# Patient Record
Sex: FEMALE | Race: WHITE | NOT HISPANIC OR LATINO | URBAN - METROPOLITAN AREA
[De-identification: names, ages, dates, MRNs, and addresses within clinical notes are randomized per-mention and may not be internally consistent; named-entity substitution may affect disease eponyms.]

---

## 2019-01-04 ENCOUNTER — OUTPATIENT (OUTPATIENT)
Dept: OUTPATIENT SERVICES | Facility: HOSPITAL | Age: 44
LOS: 1 days | Discharge: HOME | End: 2019-01-04

## 2019-01-04 VITALS
WEIGHT: 165.79 LBS | TEMPERATURE: 98 F | DIASTOLIC BLOOD PRESSURE: 61 MMHG | OXYGEN SATURATION: 100 % | HEIGHT: 65 IN | RESPIRATION RATE: 16 BRPM | HEART RATE: 79 BPM | SYSTOLIC BLOOD PRESSURE: 102 MMHG

## 2019-01-04 DIAGNOSIS — Z01.818 ENCOUNTER FOR OTHER PREPROCEDURAL EXAMINATION: ICD-10-CM

## 2019-01-04 DIAGNOSIS — M20.12 HALLUX VALGUS (ACQUIRED), LEFT FOOT: ICD-10-CM

## 2019-01-04 NOTE — H&P PST ADULT - PMH
Backache symptom  CAUSING ARM/LEG TINGLING  Bruise  as per patient-  bruising after banging.  Hypercholesteremia    Neckache

## 2019-01-04 NOTE — H&P PST ADULT - REASON FOR ADMISSION
PT PRESENTS TO PAST WITH NO SOB, CP, PALPITATIONS, DYSURIA, UTI OR URI AT PRESENT.   PT ABLE TO WALK UP 2-3 FLIGHTS OF STEPS WITH NO SOB.  AS PER THE PT, THIS IS HIS/HER COMPLETE MEDICAL AND SURGICAL HX, INCLUDING MEDICATIONS PRESCRIBED AND OVER THE COUNTER.   PT REPORTS- 2 WKS AGO SHE HAD A SINUS INFECTION.   PT WAS TREATED WITH AN ANTIBIOTIC.   PT STATES-- ALL S/S HAVE SUBSIDED.    PT PRESENTS TO PAST WITH H/O-- LEFT BUNION.

## 2019-01-04 NOTE — H&P PST ADULT - RS GEN PE MLT RESP DETAILS PC
respirations non-labored/no chest wall tenderness/airway patent/clear to auscultation bilaterally/no intercostal retractions/normal/good air movement/breath sounds equal

## 2019-01-25 ENCOUNTER — RESULT REVIEW (OUTPATIENT)
Age: 44
End: 2019-01-25

## 2019-01-25 ENCOUNTER — OUTPATIENT (OUTPATIENT)
Dept: OUTPATIENT SERVICES | Facility: HOSPITAL | Age: 44
LOS: 1 days | Discharge: HOME | End: 2019-01-25

## 2019-01-25 VITALS
TEMPERATURE: 97 F | HEART RATE: 68 BPM | OXYGEN SATURATION: 98 % | RESPIRATION RATE: 17 BRPM | DIASTOLIC BLOOD PRESSURE: 78 MMHG | WEIGHT: 166.01 LBS | SYSTOLIC BLOOD PRESSURE: 123 MMHG | HEIGHT: 65 IN

## 2019-01-25 VITALS — SYSTOLIC BLOOD PRESSURE: 109 MMHG | RESPIRATION RATE: 18 BRPM | HEART RATE: 55 BPM | DIASTOLIC BLOOD PRESSURE: 79 MMHG

## 2019-01-25 RX ORDER — ONDANSETRON 8 MG/1
4 TABLET, FILM COATED ORAL ONCE
Qty: 0 | Refills: 0 | Status: DISCONTINUED | OUTPATIENT
Start: 2019-01-25 | End: 2019-02-09

## 2019-01-25 RX ORDER — HYDROMORPHONE HYDROCHLORIDE 2 MG/ML
0.5 INJECTION INTRAMUSCULAR; INTRAVENOUS; SUBCUTANEOUS
Qty: 0 | Refills: 0 | Status: DISCONTINUED | OUTPATIENT
Start: 2019-01-25 | End: 2019-01-25

## 2019-01-25 RX ORDER — OXYCODONE AND ACETAMINOPHEN 5; 325 MG/1; MG/1
1 TABLET ORAL ONCE
Qty: 0 | Refills: 0 | Status: DISCONTINUED | OUTPATIENT
Start: 2019-01-25 | End: 2019-01-25

## 2019-01-25 RX ORDER — ACETAMINOPHEN 500 MG
650 TABLET ORAL ONCE
Qty: 0 | Refills: 0 | Status: DISCONTINUED | OUTPATIENT
Start: 2019-01-25 | End: 2019-02-09

## 2019-01-25 RX ORDER — SODIUM CHLORIDE 9 MG/ML
1000 INJECTION, SOLUTION INTRAVENOUS
Qty: 0 | Refills: 0 | Status: DISCONTINUED | OUTPATIENT
Start: 2019-01-25 | End: 2019-02-09

## 2019-01-25 NOTE — BRIEF OPERATIVE NOTE - PROCEDURE
<<-----Click on this checkbox to enter Procedure Eliezer bunionectomy  01/25/2019  Eliezer aguilera left foot  Active  [id number]

## 2019-01-25 NOTE — ASU DISCHARGE PLAN (ADULT/PEDIATRIC). - NOTIFY
Swelling that continues/Fever greater than 101/Numbness, tingling/Pain not relieved by Medications/Inability to Tolerate Liquids or Foods/Excessive Diarrhea/Numbness, color, or temperature change to extremity/Bleeding that does not stop/Persistent Nausea and Vomiting

## 2019-01-29 LAB — SURGICAL PATHOLOGY STUDY: SIGNIFICANT CHANGE UP

## 2019-01-31 DIAGNOSIS — M54.2 CERVICALGIA: ICD-10-CM

## 2019-01-31 DIAGNOSIS — Z87.891 PERSONAL HISTORY OF NICOTINE DEPENDENCE: ICD-10-CM

## 2019-01-31 DIAGNOSIS — M54.9 DORSALGIA, UNSPECIFIED: ICD-10-CM

## 2019-01-31 DIAGNOSIS — E78.00 PURE HYPERCHOLESTEROLEMIA, UNSPECIFIED: ICD-10-CM

## 2019-01-31 DIAGNOSIS — M20.12 HALLUX VALGUS (ACQUIRED), LEFT FOOT: ICD-10-CM

## 2019-03-28 ENCOUNTER — OUTPATIENT (OUTPATIENT)
Dept: OUTPATIENT SERVICES | Facility: HOSPITAL | Age: 44
LOS: 1 days | Discharge: HOME | End: 2019-03-28

## 2019-03-28 DIAGNOSIS — Z12.31 ENCOUNTER FOR SCREENING MAMMOGRAM FOR MALIGNANT NEOPLASM OF BREAST: ICD-10-CM

## 2019-03-28 PROBLEM — M54.9 DORSALGIA, UNSPECIFIED: Chronic | Status: ACTIVE | Noted: 2019-01-04

## 2019-03-28 PROBLEM — T14.8XXA OTHER INJURY OF UNSPECIFIED BODY REGION, INITIAL ENCOUNTER: Chronic | Status: ACTIVE | Noted: 2019-01-04

## 2019-03-28 PROBLEM — E78.00 PURE HYPERCHOLESTEROLEMIA, UNSPECIFIED: Chronic | Status: ACTIVE | Noted: 2019-01-04

## 2019-03-28 PROBLEM — M54.2 CERVICALGIA: Chronic | Status: ACTIVE | Noted: 2019-01-04

## 2020-07-20 ENCOUNTER — OUTPATIENT (OUTPATIENT)
Dept: OUTPATIENT SERVICES | Facility: HOSPITAL | Age: 45
LOS: 1 days | Discharge: HOME | End: 2020-07-20
Payer: COMMERCIAL

## 2020-07-20 VITALS
OXYGEN SATURATION: 99 % | TEMPERATURE: 98 F | RESPIRATION RATE: 16 BRPM | DIASTOLIC BLOOD PRESSURE: 78 MMHG | HEIGHT: 65 IN | SYSTOLIC BLOOD PRESSURE: 119 MMHG | WEIGHT: 182.1 LBS | HEART RATE: 60 BPM

## 2020-07-20 DIAGNOSIS — T85.79XD INFECTION AND INFLAMMATORY REACTION DUE TO OTHER INTERNAL PROSTHETIC DEVICES, IMPLANTS AND GRAFTS, SUBSEQUENT ENCOUNTER: ICD-10-CM

## 2020-07-20 DIAGNOSIS — Z01.818 ENCOUNTER FOR OTHER PREPROCEDURAL EXAMINATION: ICD-10-CM

## 2020-07-20 DIAGNOSIS — T85.79XA INFECTION AND INFLAMMATORY REACTION DUE TO OTHER INTERNAL PROSTHETIC DEVICES, IMPLANTS AND GRAFTS, INITIAL ENCOUNTER: ICD-10-CM

## 2020-07-20 DIAGNOSIS — Z98.890 OTHER SPECIFIED POSTPROCEDURAL STATES: Chronic | ICD-10-CM

## 2020-07-20 LAB
ALBUMIN SERPL ELPH-MCNC: 4.3 G/DL — SIGNIFICANT CHANGE UP (ref 3.5–5.2)
ALP SERPL-CCNC: 86 U/L — SIGNIFICANT CHANGE UP (ref 30–115)
ALT FLD-CCNC: 26 U/L — SIGNIFICANT CHANGE UP (ref 0–41)
ANION GAP SERPL CALC-SCNC: 13 MMOL/L — SIGNIFICANT CHANGE UP (ref 7–14)
AST SERPL-CCNC: 30 U/L — SIGNIFICANT CHANGE UP (ref 0–41)
BASOPHILS # BLD AUTO: 0.02 K/UL — SIGNIFICANT CHANGE UP (ref 0–0.2)
BASOPHILS NFR BLD AUTO: 0.4 % — SIGNIFICANT CHANGE UP (ref 0–1)
BILIRUB SERPL-MCNC: 0.3 MG/DL — SIGNIFICANT CHANGE UP (ref 0.2–1.2)
BUN SERPL-MCNC: 15 MG/DL — SIGNIFICANT CHANGE UP (ref 10–20)
CALCIUM SERPL-MCNC: 9.7 MG/DL — SIGNIFICANT CHANGE UP (ref 8.5–10.1)
CHLORIDE SERPL-SCNC: 104 MMOL/L — SIGNIFICANT CHANGE UP (ref 98–110)
CO2 SERPL-SCNC: 26 MMOL/L — SIGNIFICANT CHANGE UP (ref 17–32)
CREAT SERPL-MCNC: 0.8 MG/DL — SIGNIFICANT CHANGE UP (ref 0.7–1.5)
EOSINOPHIL # BLD AUTO: 0.14 K/UL — SIGNIFICANT CHANGE UP (ref 0–0.7)
EOSINOPHIL NFR BLD AUTO: 2.7 % — SIGNIFICANT CHANGE UP (ref 0–8)
GLUCOSE SERPL-MCNC: 89 MG/DL — SIGNIFICANT CHANGE UP (ref 70–99)
HCT VFR BLD CALC: 42.8 % — SIGNIFICANT CHANGE UP (ref 37–47)
HGB BLD-MCNC: 14.1 G/DL — SIGNIFICANT CHANGE UP (ref 12–16)
IMM GRANULOCYTES NFR BLD AUTO: 0.4 % — HIGH (ref 0.1–0.3)
LYMPHOCYTES # BLD AUTO: 1.85 K/UL — SIGNIFICANT CHANGE UP (ref 1.2–3.4)
LYMPHOCYTES # BLD AUTO: 35.9 % — SIGNIFICANT CHANGE UP (ref 20.5–51.1)
MCHC RBC-ENTMCNC: 31.8 PG — HIGH (ref 27–31)
MCHC RBC-ENTMCNC: 32.9 G/DL — SIGNIFICANT CHANGE UP (ref 32–37)
MCV RBC AUTO: 96.6 FL — SIGNIFICANT CHANGE UP (ref 81–99)
MONOCYTES # BLD AUTO: 0.54 K/UL — SIGNIFICANT CHANGE UP (ref 0.1–0.6)
MONOCYTES NFR BLD AUTO: 10.5 % — HIGH (ref 1.7–9.3)
NEUTROPHILS # BLD AUTO: 2.58 K/UL — SIGNIFICANT CHANGE UP (ref 1.4–6.5)
NEUTROPHILS NFR BLD AUTO: 50.1 % — SIGNIFICANT CHANGE UP (ref 42.2–75.2)
NRBC # BLD: 0 /100 WBCS — SIGNIFICANT CHANGE UP (ref 0–0)
PLATELET # BLD AUTO: 243 K/UL — SIGNIFICANT CHANGE UP (ref 130–400)
POTASSIUM SERPL-MCNC: 4.5 MMOL/L — SIGNIFICANT CHANGE UP (ref 3.5–5)
POTASSIUM SERPL-SCNC: 4.5 MMOL/L — SIGNIFICANT CHANGE UP (ref 3.5–5)
PROT SERPL-MCNC: 7.1 G/DL — SIGNIFICANT CHANGE UP (ref 6–8)
RBC # BLD: 4.43 M/UL — SIGNIFICANT CHANGE UP (ref 4.2–5.4)
RBC # FLD: 13 % — SIGNIFICANT CHANGE UP (ref 11.5–14.5)
SODIUM SERPL-SCNC: 143 MMOL/L — SIGNIFICANT CHANGE UP (ref 135–146)
WBC # BLD: 5.15 K/UL — SIGNIFICANT CHANGE UP (ref 4.8–10.8)
WBC # FLD AUTO: 5.15 K/UL — SIGNIFICANT CHANGE UP (ref 4.8–10.8)

## 2020-07-20 PROCEDURE — 93010 ELECTROCARDIOGRAM REPORT: CPT

## 2020-07-20 RX ORDER — DICLOFENAC SODIUM 75 MG/1
1 TABLET, DELAYED RELEASE ORAL
Qty: 0 | Refills: 0 | DISCHARGE

## 2020-07-20 RX ORDER — IBUPROFEN 200 MG
1 TABLET ORAL
Qty: 0 | Refills: 0 | DISCHARGE

## 2020-07-20 RX ORDER — GABAPENTIN 400 MG/1
0 CAPSULE ORAL
Qty: 0 | Refills: 0 | DISCHARGE

## 2020-07-20 NOTE — H&P PST ADULT - HISTORY OF PRESENT ILLNESS
46 yo female presents s/p left bunionectomy 1/2019, pt has been c/o "pin is sticking out" pt is scheduled for k wire removal;  denies chest pain, palpitations, shortness of breath, dyspnea, or dysuria. exercise tolerance: walks 6 miles of stairs w/o sob   pt denies any known exposure to COVID-19, denies any S&S  pt instructed re: self quarantine guidelines

## 2020-07-20 NOTE — H&P PST ADULT - NSICDXPASTMEDICALHX_GEN_ALL_CORE_FT
PAST MEDICAL HISTORY:  Backache symptom CAUSING ARM/LEG TINGLING    Bruise as per patient-  bruising after banging.    Chronic GERD     Hypercholesteremia     Lumbar disc herniation     Neckache

## 2020-07-21 ENCOUNTER — OUTPATIENT (OUTPATIENT)
Dept: OUTPATIENT SERVICES | Facility: HOSPITAL | Age: 45
LOS: 1 days | Discharge: HOME | End: 2020-07-21

## 2020-07-21 DIAGNOSIS — Z98.890 OTHER SPECIFIED POSTPROCEDURAL STATES: Chronic | ICD-10-CM

## 2020-07-21 DIAGNOSIS — Z11.59 ENCOUNTER FOR SCREENING FOR OTHER VIRAL DISEASES: ICD-10-CM

## 2020-07-21 PROBLEM — M51.26 OTHER INTERVERTEBRAL DISC DISPLACEMENT, LUMBAR REGION: Chronic | Status: ACTIVE | Noted: 2020-07-20

## 2020-07-21 PROBLEM — K21.9 GASTRO-ESOPHAGEAL REFLUX DISEASE WITHOUT ESOPHAGITIS: Chronic | Status: ACTIVE | Noted: 2020-07-20

## 2020-07-23 NOTE — ASU PATIENT PROFILE, ADULT - PMH
Backache symptom  CAUSING ARM/LEG TINGLING  Bruise  as per patient-  bruising after banging.  Chronic GERD    Hypercholesteremia    Lumbar disc herniation    Neckache Backache symptom  CAUSING ARM/LEG TINGLING  Bruise  as per patient-  bruising after banging.  Chronic GERD    Herniation of intervertebral disc of cervical spine due to degeneration    Hypercholesteremia    Lumbar disc herniation    Neckache

## 2020-07-24 ENCOUNTER — RESULT REVIEW (OUTPATIENT)
Age: 45
End: 2020-07-24

## 2020-07-24 ENCOUNTER — OUTPATIENT (OUTPATIENT)
Dept: OUTPATIENT SERVICES | Facility: HOSPITAL | Age: 45
LOS: 1 days | Discharge: HOME | End: 2020-07-24
Payer: COMMERCIAL

## 2020-07-24 VITALS
TEMPERATURE: 98 F | DIASTOLIC BLOOD PRESSURE: 77 MMHG | WEIGHT: 177.91 LBS | HEART RATE: 55 BPM | RESPIRATION RATE: 19 BRPM | SYSTOLIC BLOOD PRESSURE: 117 MMHG | HEIGHT: 65 IN | OXYGEN SATURATION: 98 %

## 2020-07-24 VITALS — RESPIRATION RATE: 18 BRPM | HEART RATE: 61 BPM | SYSTOLIC BLOOD PRESSURE: 116 MMHG | DIASTOLIC BLOOD PRESSURE: 65 MMHG

## 2020-07-24 DIAGNOSIS — Z98.890 OTHER SPECIFIED POSTPROCEDURAL STATES: Chronic | ICD-10-CM

## 2020-07-24 PROCEDURE — 88300 SURGICAL PATH GROSS: CPT | Mod: 26

## 2020-07-24 RX ORDER — SODIUM CHLORIDE 9 MG/ML
1000 INJECTION, SOLUTION INTRAVENOUS
Refills: 0 | Status: DISCONTINUED | OUTPATIENT
Start: 2020-07-24 | End: 2020-08-08

## 2020-07-24 RX ORDER — OXYCODONE AND ACETAMINOPHEN 5; 325 MG/1; MG/1
1 TABLET ORAL EVERY 4 HOURS
Refills: 0 | Status: DISCONTINUED | OUTPATIENT
Start: 2020-07-24 | End: 2020-07-24

## 2020-07-24 RX ORDER — HYDROMORPHONE HYDROCHLORIDE 2 MG/ML
0.5 INJECTION INTRAMUSCULAR; INTRAVENOUS; SUBCUTANEOUS
Refills: 0 | Status: DISCONTINUED | OUTPATIENT
Start: 2020-07-24 | End: 2020-07-24

## 2020-07-24 RX ADMIN — SODIUM CHLORIDE 100 MILLILITER(S): 9 INJECTION, SOLUTION INTRAVENOUS at 16:05

## 2020-07-24 NOTE — CHART NOTE - NSCHARTNOTEFT_GEN_A_CORE
PACU ANESTHESIA ADMISSION NOTE      Procedure: Removal of hardware from metatarsal bone of left foot    Post op diagnosis:  Fixation hardware in foot      ____  Intubated  TV:______       Rate: ______      FiO2: ______    __x__  Patent Airway    __x__  Full return of protective reflexes    __x__  Full recovery from anesthesia / back to baseline status    Vitals:  HR: 68  BP: 96/53  RR:14  O2 Sat: 97  Temp: 97.8    Mental Status:  __x__ Awake   _x___ Alert   _____ Drowsy   _____ Sedated    Nausea/Vomiting:  _x__ NO  ______Yes,   See Post - Op Orders          Pain Scale (0-10):  ___0__    Treatment: ____ None    ____ See Post - Op/PCA Orders    Post - Operative Fluids:   ____ Oral   ____ See Post - Op Orders    Plan: Discharge:   _x___Home       _____Floor     _____Critical Care   Other:_________________    Comments: Patient had smooth intraoperative event, no anesthesia complication.

## 2020-07-24 NOTE — ASU DISCHARGE PLAN (ADULT/PEDIATRIC) - CARE PROVIDER_API CALL
Cleveland Guevara  Podiatric Medicine and Surgery  [address] [place] [id number]  Phone: [phone]  Fax: [phone]  Follow Up Time:

## 2020-07-24 NOTE — ASU DISCHARGE PLAN (ADULT/PEDIATRIC) - CALL YOUR DOCTOR IF YOU HAVE ANY OF THE FOLLOWING:
Bleeding that does not stop/Numbness, tingling, color or temperature change to extremity/Increased irritability or sluggishness/Swelling that gets worse/Nausea and vomiting that does not stop/Wound/Surgical Site with redness, or foul smelling discharge or pus/Unable to urinate/Excessive diarrhea/Fever greater than (need to indicate Fahrenheit or Celsius)/Inability to tolerate liquids or foods/Pain not relieved by Medications

## 2020-07-24 NOTE — BRIEF OPERATIVE NOTE - NSICDXBRIEFPROCEDURE_GEN_ALL_CORE_FT
PROCEDURES:  Removal of hardware from metatarsal bone of left foot 24-Jul-2020 15:27:21  Rajesh Thomson

## 2020-07-24 NOTE — ASU DISCHARGE PLAN (ADULT/PEDIATRIC) - ASU DC SPECIAL INSTRUCTIONSFT
Keep dressing dry, clean, and intact  WBAT L foot in a surgical shoe  Follow up with Dr Guevara in his office on Monday

## 2020-07-27 LAB — SURGICAL PATHOLOGY STUDY: SIGNIFICANT CHANGE UP

## 2020-07-29 DIAGNOSIS — M50.20 OTHER CERVICAL DISC DISPLACEMENT, UNSPECIFIED CERVICAL REGION: ICD-10-CM

## 2020-07-29 DIAGNOSIS — T85.848A PAIN DUE TO OTHER INTERNAL PROSTHETIC DEVICES, IMPLANTS AND GRAFTS, INITIAL ENCOUNTER: ICD-10-CM

## 2020-07-29 DIAGNOSIS — Z87.891 PERSONAL HISTORY OF NICOTINE DEPENDENCE: ICD-10-CM

## 2020-07-29 DIAGNOSIS — Z47.2 ENCOUNTER FOR REMOVAL OF INTERNAL FIXATION DEVICE: ICD-10-CM

## 2020-07-29 DIAGNOSIS — E78.00 PURE HYPERCHOLESTEROLEMIA, UNSPECIFIED: ICD-10-CM
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
## 2020-11-02 NOTE — ASU PATIENT PROFILE, ADULT - NSSUBSTANCEUSE_GEN_ALL_CORE_SD
Nausea and vomiting that does not stop/Increased irritability or sluggishness/Unable to urinate/Inability to tolerate liquids or foods
never used

## 2021-02-05 ENCOUNTER — INPATIENT (INPATIENT)
Facility: HOSPITAL | Age: 46
LOS: 2 days | Discharge: HOME | End: 2021-02-08
Attending: HOSPITALIST | Admitting: FAMILY MEDICINE
Payer: COMMERCIAL

## 2021-02-05 VITALS
DIASTOLIC BLOOD PRESSURE: 84 MMHG | OXYGEN SATURATION: 98 % | HEART RATE: 70 BPM | SYSTOLIC BLOOD PRESSURE: 122 MMHG | RESPIRATION RATE: 20 BRPM

## 2021-02-05 DIAGNOSIS — I26.99 OTHER PULMONARY EMBOLISM WITHOUT ACUTE COR PULMONALE: ICD-10-CM

## 2021-02-05 DIAGNOSIS — R09.89 OTHER SPECIFIED SYMPTOMS AND SIGNS INVOLVING THE CIRCULATORY AND RESPIRATORY SYSTEMS: ICD-10-CM

## 2021-02-05 DIAGNOSIS — Z98.890 OTHER SPECIFIED POSTPROCEDURAL STATES: Chronic | ICD-10-CM

## 2021-02-05 PROBLEM — M50.20 OTHER CERVICAL DISC DISPLACEMENT, UNSPECIFIED CERVICAL REGION: Chronic | Status: ACTIVE | Noted: 2020-07-24

## 2021-02-05 LAB
ALBUMIN SERPL ELPH-MCNC: 4 G/DL — SIGNIFICANT CHANGE UP (ref 3.5–5.2)
ALP SERPL-CCNC: 108 U/L — SIGNIFICANT CHANGE UP (ref 30–115)
ALT FLD-CCNC: 34 U/L — SIGNIFICANT CHANGE UP (ref 0–41)
ANION GAP SERPL CALC-SCNC: 12 MMOL/L — SIGNIFICANT CHANGE UP (ref 7–14)
AST SERPL-CCNC: 25 U/L — SIGNIFICANT CHANGE UP (ref 0–41)
BASOPHILS # BLD AUTO: 0.03 K/UL — SIGNIFICANT CHANGE UP (ref 0–0.2)
BASOPHILS NFR BLD AUTO: 0.5 % — SIGNIFICANT CHANGE UP (ref 0–1)
BILIRUB SERPL-MCNC: 0.4 MG/DL — SIGNIFICANT CHANGE UP (ref 0.2–1.2)
BUN SERPL-MCNC: 17 MG/DL — SIGNIFICANT CHANGE UP (ref 10–20)
CALCIUM SERPL-MCNC: 9.5 MG/DL — SIGNIFICANT CHANGE UP (ref 8.5–10.1)
CHLORIDE SERPL-SCNC: 106 MMOL/L — SIGNIFICANT CHANGE UP (ref 98–110)
CO2 SERPL-SCNC: 22 MMOL/L — SIGNIFICANT CHANGE UP (ref 17–32)
CREAT SERPL-MCNC: 0.8 MG/DL — SIGNIFICANT CHANGE UP (ref 0.7–1.5)
EOSINOPHIL # BLD AUTO: 0.1 K/UL — SIGNIFICANT CHANGE UP (ref 0–0.7)
EOSINOPHIL NFR BLD AUTO: 1.7 % — SIGNIFICANT CHANGE UP (ref 0–8)
GLUCOSE SERPL-MCNC: 102 MG/DL — HIGH (ref 70–99)
HCG SERPL QL: NEGATIVE — SIGNIFICANT CHANGE UP
HCT VFR BLD CALC: 37.7 % — SIGNIFICANT CHANGE UP (ref 37–47)
HGB BLD-MCNC: 12.7 G/DL — SIGNIFICANT CHANGE UP (ref 12–16)
IMM GRANULOCYTES NFR BLD AUTO: 0.5 % — HIGH (ref 0.1–0.3)
LACTATE SERPL-SCNC: 1.7 MMOL/L — SIGNIFICANT CHANGE UP (ref 0.7–2)
LYMPHOCYTES # BLD AUTO: 2.08 K/UL — SIGNIFICANT CHANGE UP (ref 1.2–3.4)
LYMPHOCYTES # BLD AUTO: 36 % — SIGNIFICANT CHANGE UP (ref 20.5–51.1)
MCHC RBC-ENTMCNC: 30 PG — SIGNIFICANT CHANGE UP (ref 27–31)
MCHC RBC-ENTMCNC: 33.7 G/DL — SIGNIFICANT CHANGE UP (ref 32–37)
MCV RBC AUTO: 88.9 FL — SIGNIFICANT CHANGE UP (ref 81–99)
MONOCYTES # BLD AUTO: 0.71 K/UL — HIGH (ref 0.1–0.6)
MONOCYTES NFR BLD AUTO: 12.3 % — HIGH (ref 1.7–9.3)
NEUTROPHILS # BLD AUTO: 2.83 K/UL — SIGNIFICANT CHANGE UP (ref 1.4–6.5)
NEUTROPHILS NFR BLD AUTO: 49 % — SIGNIFICANT CHANGE UP (ref 42.2–75.2)
NRBC # BLD: 0 /100 WBCS — SIGNIFICANT CHANGE UP (ref 0–0)
NT-PROBNP SERPL-SCNC: 84 PG/ML — SIGNIFICANT CHANGE UP (ref 0–300)
PLATELET # BLD AUTO: 270 K/UL — SIGNIFICANT CHANGE UP (ref 130–400)
POTASSIUM SERPL-MCNC: 4 MMOL/L — SIGNIFICANT CHANGE UP (ref 3.5–5)
POTASSIUM SERPL-SCNC: 4 MMOL/L — SIGNIFICANT CHANGE UP (ref 3.5–5)
PROT SERPL-MCNC: 6.6 G/DL — SIGNIFICANT CHANGE UP (ref 6–8)
RAPID RVP RESULT: SIGNIFICANT CHANGE UP
RBC # BLD: 4.24 M/UL — SIGNIFICANT CHANGE UP (ref 4.2–5.4)
RBC # FLD: 12.3 % — SIGNIFICANT CHANGE UP (ref 11.5–14.5)
SARS-COV-2 RNA SPEC QL NAA+PROBE: SIGNIFICANT CHANGE UP
SODIUM SERPL-SCNC: 140 MMOL/L — SIGNIFICANT CHANGE UP (ref 135–146)
TROPONIN T SERPL-MCNC: <0.01 NG/ML — SIGNIFICANT CHANGE UP
WBC # BLD: 5.78 K/UL — SIGNIFICANT CHANGE UP (ref 4.8–10.8)
WBC # FLD AUTO: 5.78 K/UL — SIGNIFICANT CHANGE UP (ref 4.8–10.8)

## 2021-02-05 PROCEDURE — 71045 X-RAY EXAM CHEST 1 VIEW: CPT | Mod: 26

## 2021-02-05 PROCEDURE — 99285 EMERGENCY DEPT VISIT HI MDM: CPT

## 2021-02-05 PROCEDURE — 93970 EXTREMITY STUDY: CPT | Mod: 26

## 2021-02-05 PROCEDURE — 99222 1ST HOSP IP/OBS MODERATE 55: CPT

## 2021-02-05 PROCEDURE — 71275 CT ANGIOGRAPHY CHEST: CPT | Mod: 26

## 2021-02-05 RX ORDER — CHLORHEXIDINE GLUCONATE 213 G/1000ML
1 SOLUTION TOPICAL
Refills: 0 | Status: DISCONTINUED | OUTPATIENT
Start: 2021-02-05 | End: 2021-02-08

## 2021-02-05 RX ORDER — ENOXAPARIN SODIUM 100 MG/ML
80 INJECTION SUBCUTANEOUS EVERY 12 HOURS
Refills: 0 | Status: DISCONTINUED | OUTPATIENT
Start: 2021-02-05 | End: 2021-02-07

## 2021-02-05 RX ORDER — GABAPENTIN 400 MG/1
0 CAPSULE ORAL
Qty: 0 | Refills: 0 | DISCHARGE

## 2021-02-05 RX ORDER — GABAPENTIN 400 MG/1
300 CAPSULE ORAL THREE TIMES A DAY
Refills: 0 | Status: DISCONTINUED | OUTPATIENT
Start: 2021-02-05 | End: 2021-02-08

## 2021-02-05 RX ORDER — ENOXAPARIN SODIUM 100 MG/ML
80 INJECTION SUBCUTANEOUS ONCE
Refills: 0 | Status: COMPLETED | OUTPATIENT
Start: 2021-02-05 | End: 2021-02-05

## 2021-02-05 RX ORDER — CETIRIZINE HYDROCHLORIDE 10 MG/1
0 TABLET ORAL
Qty: 0 | Refills: 0 | DISCHARGE

## 2021-02-05 RX ORDER — KETOROLAC TROMETHAMINE 30 MG/ML
30 SYRINGE (ML) INJECTION EVERY 8 HOURS
Refills: 0 | Status: DISCONTINUED | OUTPATIENT
Start: 2021-02-05 | End: 2021-02-08

## 2021-02-05 RX ORDER — LORATADINE 10 MG/1
10 TABLET ORAL DAILY
Refills: 0 | Status: DISCONTINUED | OUTPATIENT
Start: 2021-02-05 | End: 2021-02-06

## 2021-02-05 RX ADMIN — Medication 30 MILLIGRAM(S): at 21:28

## 2021-02-05 RX ADMIN — ENOXAPARIN SODIUM 80 MILLIGRAM(S): 100 INJECTION SUBCUTANEOUS at 17:27

## 2021-02-05 RX ADMIN — GABAPENTIN 300 MILLIGRAM(S): 400 CAPSULE ORAL at 21:41

## 2021-02-05 NOTE — H&P ADULT - ASSESSMENT
Patient is a 45 year old Female with a past medical history of HLD not on statin, anxiety, herniated discs, recent covid 25 days ago and ended quarantine on 1/24th presented to the ER with a chief complaint of worsening SOB x 3 days. CTA Chest revealing "Small subsegmental pulmonary emboli in bilateral upper lobes and left lower lobe." EKG Sinus zach. No tachycardia.     # Bilateral PE likely secondary to Immobilization from COVID infection   - Continue with Lovenox (80 BID)   - Monitor Coags   - Venous Duplex Scan  - Hemodynamically stable   - Thrombophilia  - Toradol PRN     # HLD   - Not on any statin   - DASH diet     Discussed with the Nocturnal Attending

## 2021-02-05 NOTE — H&P ADULT - NSHPPHYSICALEXAM_GEN_ALL_CORE
VITALS: Vital Signs Last 24 Hrs  T(C): 36.8 (05 Feb 2021 12:51), Max: 36.8 (05 Feb 2021 12:51)  T(F): 98.3 (05 Feb 2021 12:51), Max: 98.3 (05 Feb 2021 12:51)  HR: 59 (05 Feb 2021 12:51) (59 - 70)  BP: 122/84 (05 Feb 2021 12:51) (122/84 - 122/84)  RR: 22 (05 Feb 2021 12:51) (20 - 22)  SpO2: 98% (05 Feb 2021 12:51) (98% - 98%)    GENERAL: NAD, lying in bed comfortably with mask on  HEAD:  Atraumatic, Normocephalic  EYES: Conjunctiva and sclera clear  ENT: Moist mucous membranes  NECK: Supple, No JVD  CHEST/LUNG: Clear to auscultation bilaterally. Unlabored respirations  HEART: Regular rate and rhythm  ABDOMEN: Bowel sounds present; Soft, Nontender, Nondistended.   EXTREMITIES:  Unable to assess skin secondary to tight leggins, no calf tenderness, no edema   NERVOUS SYSTEM:  Alert & Oriented X3, speech clear.  MSK: FROM all 4 extremities, full and equal strength

## 2021-02-05 NOTE — H&P ADULT - NSHPSOCIALHISTORY_GEN_ALL_CORE
Tobacco use: Exsmoker, quit 12 years ago, history of 20packyear hx   EtOH use: Occasional  Illicit drug use: Denies

## 2021-02-05 NOTE — ED PROVIDER NOTE - NS ED ROS FT
Constitutional:  (-) fever, (-) chills, (-) lethargy  Eyes:  (-) eye pain (-) visual changes  ENMT: (-) nasal discharge, (-) sore throat. (-) neck pain or stiffness  Cardiac: (+) chest pain (-) palpitations  Respiratory:  (-) cough (+) respiratory distress.   GI:  (-) nausea (-) vomiting (-) diarrhea (-) abdominal pain.  :  (-) dysuria (-) frequency (-) burning.  MS:  (-) back pain (-) joint pain.  Neuro:  (-) headache (-) numbness (-) tingling (-) focal weakness  Skin:  (-) rash  Except as documented in the HPI,  all other systems are negative

## 2021-02-05 NOTE — ED PROVIDER NOTE - CLINICAL SUMMARY MEDICAL DECISION MAKING FREE TEXT BOX
I personally evaluated the patient. I reviewed the Resident’s or Physician Assistant’s note (as assigned above), and agree with the findings and plan except as documented in my note. Patient evaluated for chest pain, sob. Labs, ekg, cxr, CTA chest performed in ED. Discussed with radiology, states patient has pulmonary emboli with no signs of R heart strain. AC started in ed. Admitted to medicine for further evaluation and treatment.

## 2021-02-05 NOTE — H&P ADULT - HISTORY OF PRESENT ILLNESS
Patient is a 45 year old Female with a past medical history of HLD not on statin, anxiety, herniated discs, recent covid 25 days ago and ended quarantine on 1/24th presented to the ER with a chief complaint of worsening SOB x 3 days. SOB is associated with pleuritic chest pain, dyspnea, tachypnea, leg tenderness but thought it was from herniated discs. Patient admits to prolong immobilization with COVID19 basically bedbound, varicose veins. Patient also admits to history of easy bruising but no workup as she thought it was from the use of anti-inflammatories. Patient denies factor V liden, protein c, s, anti-thrombin deficiencies, recent surgeries, joint replacements, major trauma, cancer, hormonal replacement (on menopause - 2016), previous thrombosis, antiphospholipid syndrome, kidney disease. Patient denies hemoptysis, tachycardia, leg swelling (only shin tenderness), fevers, chills, nausea, vomiting, abdominal pain, dysuria, constipation, diarrhea.  Patient is a 45 year old Female with a past medical history of HLD not on statin, anxiety, herniated discs, recent covid 25 days ago and ended quarantine on 1/24th presented to the ER with a chief complaint of worsening SOB x 3 days. SOB is associated with pleuritic chest pain, dyspnea, tachypnea, leg tenderness but thought it was from herniated discs. Patient admits to prolong immobilization with COVID19 basically bedbound, varicose veins. Patient also admits to history of easy bruising but no workup as she thought it was from the use of anti-inflammatories. Patient denies factor V liden, protein c, s, anti-thrombin deficiencies, recent surgeries, joint replacements, major trauma, cancer, hormonal replacement (on menopause - 2016), previous thrombosis, antiphospholipid syndrome, kidney disease. Patient denies hemoptysis, tachycardia, leg swelling (only shin tenderness), fevers, chills, nausea, vomiting, abdominal pain, dysuria, constipation, diarrhea.     In ER, her vitals were stable and she was saturating 98% at RA. Labs were unremarkable. Xray Chest was unremarkable. CT PE protocol showed Small subsegmental pulmonary emboli in bilateral upper lobes and left lower lob. Patient was admitted for Subsegmental PE likely secondary to recent COVID infection.

## 2021-02-05 NOTE — ED PROVIDER NOTE - ATTENDING CONTRIBUTION TO CARE
46 yo F pmh hld, recent covid dx 25 days ago pw chest tightness. Chest tightness for the past 2 days, associated with new onset sob. No syncope, no palpitations, no n/v, no abd pain, no back pain, no urinary sx, no headache, no neck pain, no leg pain/swelling, no recent travel/immobilization, no recent surgery.     CONSTITUTIONAL: Well-developed; well-nourished; in no acute distress. Sitting up and providing appropriate history and physical examination  SKIN: skin exam is warm and dry, no acute rash.  HEAD: Normocephalic; atraumatic.  EYES: PERRL, 3 mm bilateral, no nystagmus, EOM intact; conjunctiva and sclera clear.  ENT: No nasal discharge; airway clear.  NECK: Supple; non tender. + full passive ROM in all directions. No JVD  CARD: S1, S2 normal; no murmurs, gallops, or rubs. Regular rate and rhythm. + Symmetric Strong Pulses  RESP: No wheezes, rales or rhonchi. Good air movement bilaterally  ABD: soft; non-distended; non-tender. No Rebound, No Guarding, No signs of peritonitis, No CVA tenderness. No pulsatile abdominal mass. + Strong and Symmetric Pulses  EXT: Normal ROM. No clubbing, cyanosis or edema. Dp and Pt Pulses intact. Cap refill less than 3 seconds  NEURO: CN 2-12 intact, normal finger to nose, normal romberg, stable gait, no sensory or motor deficits, Alert, oriented, grossly unremarkable. No Focal deficits. GCS 15. NIH 0  PSYCH: Cooperative, appropriate.

## 2021-02-05 NOTE — ED PROVIDER NOTE - OBJECTIVE STATEMENT
45 y.o F w/ pmhx HLD, COVID 25 days ago recovered p/w SOB since Tuesday, and pleuritic chest tightness that began 2 nights ago. Pt states it's become harder and harder for her to breath. No myalgias, no fever, no cough, no syncope, no recent travel, no hx blood clots, no recent immobilization, no OCP, no n/v, no abd pain, no leg swelling.

## 2021-02-05 NOTE — H&P ADULT - NSICDXPASTMEDICALHX_GEN_ALL_CORE_FT
PAST MEDICAL HISTORY:  Backache symptom CAUSING ARM/LEG TINGLING    Bruise as per patient-  bruising after banging.    Chronic GERD     Herniation of intervertebral disc of cervical spine due to degeneration     Hypercholesteremia     Lumbar disc herniation     Neckache

## 2021-02-05 NOTE — H&P ADULT - ATTENDING COMMENTS
Continue Levonox and Toradol for pain   F/U Thrombophilia work and consider Hematology consult if anything concerning  Monitor respiratory status  Monitor Vitals  DASH Diet Continue Levonox and Toradol for pain   F/U Thrombophilia workup and consider Hematology consult if anything concerning  Monitor respiratory status  Monitor Vitals  DASH Diet

## 2021-02-05 NOTE — ED PROVIDER NOTE - PHYSICAL EXAMINATION
CONSTITUTIONAL: well-appearing, in NAD  SKIN: Warm dry, normal skin turgor  HEAD: NCAT  EYES: EOMI, PERRLA, no scleral icterus, conjunctiva pink  ENT: normal pharynx with no erythema or exudates  NECK: Supple; non tender. Full ROM.  CARD: RRR, no murmurs.  RESP: clear to ausculation b/l. No crackles or wheezing. Tachypneic, saturating 99% on RA with mild exertion.  ABD: soft, non-tender, non-distended, no rebound or guarding.  EXT: Full ROM, no bony tenderness, no pedal edema, no calf tenderness  NEURO: normal motor. normal sensory. Normal gait.  PSYCH: Cooperative, appropriate.

## 2021-02-05 NOTE — H&P ADULT - NSHPLABSRESULTS_GEN_ALL_CORE
12.7   5.78  )-----------( 270      ( 05 Feb 2021 12:46 )             37.7       02-05    140  |  106  |  17  ----------------------------<  102<H>  4.0   |  22  |  0.8    Ca    9.5      05 Feb 2021 12:46    TPro  6.6  /  Alb  4.0  /  TBili  0.4  /  DBili  x   /  AST  25  /  ALT  34  /  AlkPhos  108  02-05                  Lactate Trend  02-05 @ 12:56 Lactate:1.7       CARDIAC MARKERS ( 05 Feb 2021 12:46 )  x     / <0.01 ng/mL / x     / x     / x              Serum Pregnancy: Negative (02-05-21 @ 12:46)      RADIOLOGY  CT Angio Chest PE Protocol w/ IV Cont (02.05.21 @ 15:56)   IMPRESSION:  1. Small subsegmental pulmonary emboli in bilateral upper lobes and left lower lobe.  2. Bilateral subsegmental atelectasis.  3. Mild coronary artery disease.    Xray Chest 1 View- PORTABLE-Urgent (02.05.21 @ 13:50)   Impression:  No radiographic evidence of acute cardiopulmonary disease.

## 2021-02-05 NOTE — ED PROVIDER NOTE - PMH
Pt's mother reports cough, nasal congestion, and fever, x3 days, with one episode of vomiting after coughing last night.   Backache symptom  CAUSING ARM/LEG TINGLING  Bruise  as per patient-  bruising after banging.  Chronic GERD    Herniation of intervertebral disc of cervical spine due to degeneration    Hypercholesteremia    Lumbar disc herniation    Neckache

## 2021-02-06 ENCOUNTER — TRANSCRIPTION ENCOUNTER (OUTPATIENT)
Age: 46
End: 2021-02-06

## 2021-02-06 LAB
ALBUMIN SERPL ELPH-MCNC: 3.7 G/DL — SIGNIFICANT CHANGE UP (ref 3.5–5.2)
ALP SERPL-CCNC: 98 U/L — SIGNIFICANT CHANGE UP (ref 30–115)
ALT FLD-CCNC: 28 U/L — SIGNIFICANT CHANGE UP (ref 0–41)
ANION GAP SERPL CALC-SCNC: 8 MMOL/L — SIGNIFICANT CHANGE UP (ref 7–14)
APTT BLD: 43.4 SEC — HIGH (ref 27–39.2)
AST SERPL-CCNC: 23 U/L — SIGNIFICANT CHANGE UP (ref 0–41)
BASOPHILS # BLD AUTO: 0.02 K/UL — SIGNIFICANT CHANGE UP (ref 0–0.2)
BASOPHILS NFR BLD AUTO: 0.4 % — SIGNIFICANT CHANGE UP (ref 0–1)
BILIRUB SERPL-MCNC: 0.3 MG/DL — SIGNIFICANT CHANGE UP (ref 0.2–1.2)
BUN SERPL-MCNC: 17 MG/DL — SIGNIFICANT CHANGE UP (ref 10–20)
CALCIUM SERPL-MCNC: 9.1 MG/DL — SIGNIFICANT CHANGE UP (ref 8.5–10.1)
CHLORIDE SERPL-SCNC: 109 MMOL/L — SIGNIFICANT CHANGE UP (ref 98–110)
CO2 SERPL-SCNC: 27 MMOL/L — SIGNIFICANT CHANGE UP (ref 17–32)
CREAT SERPL-MCNC: 0.9 MG/DL — SIGNIFICANT CHANGE UP (ref 0.7–1.5)
EOSINOPHIL # BLD AUTO: 0.14 K/UL — SIGNIFICANT CHANGE UP (ref 0–0.7)
EOSINOPHIL NFR BLD AUTO: 2.7 % — SIGNIFICANT CHANGE UP (ref 0–8)
GLUCOSE SERPL-MCNC: 88 MG/DL — SIGNIFICANT CHANGE UP (ref 70–99)
HCT VFR BLD CALC: 36.4 % — LOW (ref 37–47)
HGB BLD-MCNC: 12.3 G/DL — SIGNIFICANT CHANGE UP (ref 12–16)
IMM GRANULOCYTES NFR BLD AUTO: 0.2 % — SIGNIFICANT CHANGE UP (ref 0.1–0.3)
INR BLD: 1.14 RATIO — SIGNIFICANT CHANGE UP (ref 0.65–1.3)
LYMPHOCYTES # BLD AUTO: 2.78 K/UL — SIGNIFICANT CHANGE UP (ref 1.2–3.4)
LYMPHOCYTES # BLD AUTO: 53 % — HIGH (ref 20.5–51.1)
MCHC RBC-ENTMCNC: 30.7 PG — SIGNIFICANT CHANGE UP (ref 27–31)
MCHC RBC-ENTMCNC: 33.8 G/DL — SIGNIFICANT CHANGE UP (ref 32–37)
MCV RBC AUTO: 90.8 FL — SIGNIFICANT CHANGE UP (ref 81–99)
MONOCYTES # BLD AUTO: 0.61 K/UL — HIGH (ref 0.1–0.6)
MONOCYTES NFR BLD AUTO: 11.6 % — HIGH (ref 1.7–9.3)
NEUTROPHILS # BLD AUTO: 1.69 K/UL — SIGNIFICANT CHANGE UP (ref 1.4–6.5)
NEUTROPHILS NFR BLD AUTO: 32.1 % — LOW (ref 42.2–75.2)
NRBC # BLD: 0 /100 WBCS — SIGNIFICANT CHANGE UP (ref 0–0)
PLATELET # BLD AUTO: 246 K/UL — SIGNIFICANT CHANGE UP (ref 130–400)
POTASSIUM SERPL-MCNC: 4.5 MMOL/L — SIGNIFICANT CHANGE UP (ref 3.5–5)
POTASSIUM SERPL-SCNC: 4.5 MMOL/L — SIGNIFICANT CHANGE UP (ref 3.5–5)
PROT SERPL-MCNC: 6 G/DL — SIGNIFICANT CHANGE UP (ref 6–8)
PROTHROM AB SERPL-ACNC: 13.1 SEC — HIGH (ref 9.95–12.87)
RBC # BLD: 4.01 M/UL — LOW (ref 4.2–5.4)
RBC # FLD: 12.4 % — SIGNIFICANT CHANGE UP (ref 11.5–14.5)
SARS-COV-2 IGG SERPL QL IA: POSITIVE
SARS-COV-2 IGM SERPL IA-ACNC: 10.6 INDEX — HIGH
SODIUM SERPL-SCNC: 144 MMOL/L — SIGNIFICANT CHANGE UP (ref 135–146)
WBC # BLD: 5.25 K/UL — SIGNIFICANT CHANGE UP (ref 4.8–10.8)
WBC # FLD AUTO: 5.25 K/UL — SIGNIFICANT CHANGE UP (ref 4.8–10.8)

## 2021-02-06 PROCEDURE — 99233 SBSQ HOSP IP/OBS HIGH 50: CPT

## 2021-02-06 RX ORDER — KETOROLAC TROMETHAMINE 30 MG/ML
30 SYRINGE (ML) INJECTION ONCE
Refills: 0 | Status: COMPLETED | OUTPATIENT
Start: 2021-02-06 | End: 2021-02-06

## 2021-02-06 RX ORDER — CETIRIZINE HYDROCHLORIDE 10 MG/1
10 TABLET ORAL EVERY 24 HOURS
Refills: 0 | Status: DISCONTINUED | OUTPATIENT
Start: 2021-02-06 | End: 2021-02-08

## 2021-02-06 RX ORDER — ACETAMINOPHEN 500 MG
650 TABLET ORAL EVERY 8 HOURS
Refills: 0 | Status: DISCONTINUED | OUTPATIENT
Start: 2021-02-06 | End: 2021-02-08

## 2021-02-06 RX ORDER — ZOLPIDEM TARTRATE 10 MG/1
5 TABLET ORAL ONCE
Refills: 0 | Status: DISCONTINUED | OUTPATIENT
Start: 2021-02-06 | End: 2021-02-06

## 2021-02-06 RX ADMIN — ZOLPIDEM TARTRATE 5 MILLIGRAM(S): 10 TABLET ORAL at 03:24

## 2021-02-06 RX ADMIN — GABAPENTIN 300 MILLIGRAM(S): 400 CAPSULE ORAL at 21:23

## 2021-02-06 RX ADMIN — Medication 1 TABLET(S): at 10:53

## 2021-02-06 RX ADMIN — ENOXAPARIN SODIUM 80 MILLIGRAM(S): 100 INJECTION SUBCUTANEOUS at 17:15

## 2021-02-06 RX ADMIN — ENOXAPARIN SODIUM 80 MILLIGRAM(S): 100 INJECTION SUBCUTANEOUS at 04:56

## 2021-02-06 RX ADMIN — Medication 650 MILLIGRAM(S): at 15:47

## 2021-02-06 RX ADMIN — GABAPENTIN 300 MILLIGRAM(S): 400 CAPSULE ORAL at 16:34

## 2021-02-06 RX ADMIN — CETIRIZINE HYDROCHLORIDE 10 MILLIGRAM(S): 10 TABLET ORAL at 17:45

## 2021-02-06 RX ADMIN — Medication 30 MILLIGRAM(S): at 19:40

## 2021-02-06 RX ADMIN — Medication 30 MILLILITER(S): at 02:25

## 2021-02-06 RX ADMIN — GABAPENTIN 300 MILLIGRAM(S): 400 CAPSULE ORAL at 10:53

## 2021-02-06 NOTE — DISCHARGE NOTE PROVIDER - NSDCCPCAREPLAN_GEN_ALL_CORE_FT
PRINCIPAL DISCHARGE DIAGNOSIS  Diagnosis: Pulmonary embolism  Assessment and Plan of Treatment: started on Lovenox for anticoagulation and will be discharged on a DOAC       PRINCIPAL DISCHARGE DIAGNOSIS  Diagnosis: Pulmonary embolism  Assessment and Plan of Treatment: started on Lovenox for anticoagulation and will be discharged on a DOAC      SECONDARY DISCHARGE DIAGNOSES  Diagnosis: HLD (hyperlipidemia)  Assessment and Plan of Treatment: statin started, DASH diet     PRINCIPAL DISCHARGE DIAGNOSIS  Diagnosis: Pulmonary embolism  Assessment and Plan of Treatment: started on Lovenox for anticoagulation and will be discharged on xarelto  follow up with pulmonary and cardiology      SECONDARY DISCHARGE DIAGNOSES  Diagnosis: HLD (hyperlipidemia)  Assessment and Plan of Treatment: started on cholesterol medication  follow up with cardiology  please follow up with cardiology for a stress test in 4-8 weeks

## 2021-02-06 NOTE — DISCHARGE NOTE PROVIDER - HOSPITAL COURSE
HPI:      Patient is a 45 year old Female with a past medical history of HLD not on statin, anxiety, herniated discs, recent covid 25 days ago and ended quarantine on 1/24th presented to the ER with a chief complaint of worsening SOB x 3 days. SOB is associated with pleuritic chest pain, dyspnea, tachypnea, leg tenderness but thought it was from herniated discs. Patient admits to prolong immobilization with COVID19 basically bedbound, varicose veins. Patient also admits to history of easy bruising but no workup as she thought it was from the use of anti-inflammatories. Patient denies factor V liden, protein c, s, anti-thrombin deficiencies, recent surgeries, joint replacements, major trauma, cancer, hormonal replacement (on menopause - 2016), previous thrombosis, antiphospholipid syndrome, kidney disease. Patient denies hemoptysis, tachycardia, leg swelling (only shin tenderness), fevers, chills, nausea, vomiting, abdominal pain, dysuria, constipation, diarrhea.     In ER, her vitals were stable and she was saturating 98% at RA. Labs were unremarkable. Xray Chest was unremarkable. CT PE protocol showed Small subsegmental pulmonary emboli in bilateral upper lobes and left lower lob. Patient was admitted for Subsegmental PE likely secondary to recent COVID infection.     -Patient admitted to medical unit and anti-coagulation started with Lovenox 80mh q12h.  -Pulmonary consult requested:   -Patient will be discharged home on DOAC    Patent has no PMD presently  Pharmacy: [place] [facility] HPI:      Patient is a 45 year old Female with a past medical history of HLD not on statin, anxiety, herniated discs, recent covid 25 days ago and ended quarantine on 1/24th presented to the ER with a chief complaint of worsening SOB x 3 days. SOB is associated with pleuritic chest pain, dyspnea, tachypnea, leg tenderness but thought it was from herniated discs. Patient admits to prolong immobilization with COVID19 basically bedbound, varicose veins. Patient also admits to history of easy bruising but no workup as she thought it was from the use of anti-inflammatories. Patient denies factor V liden, protein c, s, anti-thrombin deficiencies, recent surgeries, joint replacements, major trauma, cancer, hormonal replacement (on menopause - 2016), previous thrombosis, antiphospholipid syndrome, kidney disease. Patient denies hemoptysis, tachycardia, leg swelling (only shin tenderness), fevers, chills, nausea, vomiting, abdominal pain, dysuria, constipation, diarrhea.     In ER, her vitals were stable and she was saturating 98% at RA. Labs were unremarkable. Xray Chest was unremarkable. CT PE protocol showed Small subsegmental pulmonary emboli in bilateral upper lobes and left lower lob. Patient was admitted for Subsegmental PE likely secondary to recent COVID infection.     -Patient admitted to medical unit and anti-coagulation started with Lovenox 80mh q12h.  -Pulmonary consult requested:   -Patient will be discharged home on DOAC  -started on a Statin  Patent has no PMD presently  Pharmacy: [place] [facility] HPI:      Patient is a 45 year old Female with a past medical history of HLD not on statin, anxiety, herniated discs, recent covid 25 days ago and ended quarantine on 1/24th presented to the ER with a chief complaint of worsening SOB x 3 days. SOB is associated with pleuritic chest pain, dyspnea, tachypnea, leg tenderness but thought it was from herniated discs. Patient admits to prolong immobilization with COVID19 basically bedbound, varicose veins. Patient also admits to history of easy bruising but no workup as she thought it was from the use of anti-inflammatories. Patient denies factor V liden, protein c, s, anti-thrombin deficiencies, recent surgeries, joint replacements, major trauma, cancer, hormonal replacement (on menopause - 2016), previous thrombosis, antiphospholipid syndrome, kidney disease. Patient denies hemoptysis, tachycardia, leg swelling (only shin tenderness), fevers, chills, nausea, vomiting, abdominal pain, dysuria, constipation, diarrhea.     In ER, her vitals were stable and she was saturating 98% at RA. Labs were unremarkable. Xray Chest was unremarkable. CT PE protocol showed Small subsegmental pulmonary emboli in bilateral upper lobes and left lower lob. Patient was admitted for Subsegmental PE likely secondary to recent COVID infection.     -Patient admitted to medical unit and anti-coagulation started with Lovenox 80mh q12h.  Patient transitioned to Xarelto upon d/c  -Pulmonary consult appreciated  -ECHO - normal LVF (prelim)  -cardio eval appreciated - outpatient stress test in 8 weeks  -started on a Statin  Patent has no PMD presently  Pharmacy: [place] [facility]    d/c planning took over 50 min  d/c papers done by me

## 2021-02-06 NOTE — DISCHARGE NOTE PROVIDER - PROVIDER TOKENS
FREE:[LAST:[PMD],PHONE:[(   )    -],FAX:[(   )    -],ADDRESS:[Patient presently has no PMD: she claims she will decide on one upon discharge.],FOLLOWUP:[1 week]] FREE:[LAST:[Medical clinic],PHONE:[phone]],FAX:[(   )    -],ADDRESS:[[address] [place] [address]    2/17/21 at 1:30pm]],PROVIDER:[TOKEN:[[id number]],FOLLOWUP:[1 week]],PROVIDER:[TOKEN:[[id number]],FOLLOWUP:[1 week]]

## 2021-02-06 NOTE — DISCHARGE NOTE PROVIDER - CARE PROVIDERS DIRECT ADDRESSES
,DirectAddress_Unknown ,DirectAddress_Unknown,[email or web address],DirectAddress_Unknown

## 2021-02-06 NOTE — DISCHARGE NOTE PROVIDER - NSDCMRMEDTOKEN_GEN_ALL_CORE_FT
gabapentin 300 mg oral capsule: 1 cap(s) orally 3 times a day  tiZANidine 4 mg oral tablet: 2 tab(s) orally every 8 hours, As Needed  ZyrTEC 10 mg oral tablet: 1 tab(s) orally once a day   acetaminophen 325 mg oral tablet: 2 tab(s) orally every 8 hours, As needed, Mild Pain (1 - 3)  atorvastatin 10 mg oral tablet: 1 tab(s) orally once a day (at bedtime)  gabapentin 300 mg oral capsule: 1 cap(s) orally 3 times a day  rivaroxaban 15 mg oral tablet: 1 tab(s) orally 2 times a day (with meals)  tiZANidine 4 mg oral tablet: 2 tab(s) orally every 8 hours, As Needed  Xarelto Starter Pack 15 mg-20 mg oral kit: 1 tab(s) orally 2 times a day   ZyrTEC 10 mg oral tablet: 1 tab(s) orally once a day   acetaminophen 325 mg oral tablet: 2 tab(s) orally every 8 hours, As needed, Mild Pain (1 - 3)  atorvastatin 10 mg oral tablet: 1 tab(s) orally once a day (at bedtime)  gabapentin 300 mg oral capsule: 1 cap(s) orally 3 times a day  tiZANidine 4 mg oral tablet: 2 tab(s) orally every 8 hours, As Needed  Xarelto Starter Pack 15 mg-20 mg oral kit: 1 tab(s) orally 2 times a day   ZyrTEC 10 mg oral tablet: 1 tab(s) orally once a day

## 2021-02-06 NOTE — DISCHARGE NOTE PROVIDER - CARE PROVIDER_API CALL
PMD,   Patient presently has no PMD: she claims she will decide on one upon discharge.  Phone: (   )    -  Fax: (   )    -  Follow Up Time: 1 week   [facility],   [address] [place] [address]    2/17/21 at 1:30pm  Phone: [phone]  Fax: (   )    -  Follow Up Time:     Rolf Morgan)  Cardiovascular Disease; Internal Medicine  [address] [place] [address]  Phone: [phone]  Fax: [phone]  Follow Up Time: 1 week    Herrera Blackburn)  Critical Care Medicine; Internal Medicine; Pulmonary Disease  [address] [place] [id number]  Phone: [phone]  Fax: [phone]  Follow Up Time: 1 week

## 2021-02-07 LAB — THROMBIN TIME: 31.8 SEC — HIGH (ref 16–25)

## 2021-02-07 PROCEDURE — 99233 SBSQ HOSP IP/OBS HIGH 50: CPT

## 2021-02-07 RX ORDER — RIVAROXABAN 15 MG-20MG
15 KIT ORAL
Refills: 0 | Status: DISCONTINUED | OUTPATIENT
Start: 2021-02-07 | End: 2021-02-08

## 2021-02-07 RX ADMIN — GABAPENTIN 300 MILLIGRAM(S): 400 CAPSULE ORAL at 15:46

## 2021-02-07 RX ADMIN — Medication 30 MILLIGRAM(S): at 21:02

## 2021-02-07 RX ADMIN — CETIRIZINE HYDROCHLORIDE 10 MILLIGRAM(S): 10 TABLET ORAL at 16:40

## 2021-02-07 RX ADMIN — Medication 30 MILLIGRAM(S): at 12:16

## 2021-02-07 RX ADMIN — RIVAROXABAN 15 MILLIGRAM(S): KIT at 16:39

## 2021-02-07 RX ADMIN — GABAPENTIN 300 MILLIGRAM(S): 400 CAPSULE ORAL at 21:02

## 2021-02-07 RX ADMIN — ENOXAPARIN SODIUM 80 MILLIGRAM(S): 100 INJECTION SUBCUTANEOUS at 05:12

## 2021-02-07 RX ADMIN — Medication 1 TABLET(S): at 12:12

## 2021-02-07 RX ADMIN — GABAPENTIN 300 MILLIGRAM(S): 400 CAPSULE ORAL at 05:12

## 2021-02-08 ENCOUNTER — TRANSCRIPTION ENCOUNTER (OUTPATIENT)
Age: 46
End: 2021-02-08

## 2021-02-08 VITALS
HEART RATE: 64 BPM | SYSTOLIC BLOOD PRESSURE: 126 MMHG | DIASTOLIC BLOOD PRESSURE: 60 MMHG | RESPIRATION RATE: 16 BRPM | TEMPERATURE: 97 F

## 2021-02-08 LAB — PROT S FREE AG PPP IA-ACNC: 87 % — SIGNIFICANT CHANGE UP (ref 61–131)

## 2021-02-08 PROCEDURE — 99239 HOSP IP/OBS DSCHRG MGMT >30: CPT

## 2021-02-08 RX ORDER — RIVAROXABAN 15 MG-20MG
1 KIT ORAL
Qty: 1 | Refills: 0
Start: 2021-02-08

## 2021-02-08 RX ORDER — ACETAMINOPHEN 500 MG
2 TABLET ORAL
Qty: 0 | Refills: 0 | DISCHARGE
Start: 2021-02-08

## 2021-02-08 RX ORDER — ATORVASTATIN CALCIUM 80 MG/1
10 TABLET, FILM COATED ORAL AT BEDTIME
Refills: 0 | Status: DISCONTINUED | OUTPATIENT
Start: 2021-02-08 | End: 2021-02-08

## 2021-02-08 RX ORDER — ATORVASTATIN CALCIUM 80 MG/1
1 TABLET, FILM COATED ORAL
Qty: 0 | Refills: 0 | DISCHARGE
Start: 2021-02-08

## 2021-02-08 RX ORDER — ATORVASTATIN CALCIUM 80 MG/1
1 TABLET, FILM COATED ORAL
Qty: 30 | Refills: 0
Start: 2021-02-08

## 2021-02-08 RX ORDER — RIVAROXABAN 15 MG-20MG
1 KIT ORAL
Qty: 0 | Refills: 0 | DISCHARGE
Start: 2021-02-08

## 2021-02-08 RX ORDER — RIVAROXABAN 15 MG-20MG
1 KIT ORAL
Qty: 51 | Refills: 0
Start: 2021-02-08

## 2021-02-08 RX ADMIN — Medication 1 TABLET(S): at 11:31

## 2021-02-08 RX ADMIN — GABAPENTIN 300 MILLIGRAM(S): 400 CAPSULE ORAL at 05:38

## 2021-02-08 RX ADMIN — RIVAROXABAN 15 MILLIGRAM(S): KIT at 07:42

## 2021-02-08 NOTE — CONSULT NOTE ADULT - SUBJECTIVE AND OBJECTIVE BOX
CARDIOLOGY CONSULT NOTE     CHIEF COMPLAINT/REASON FOR CONSULT:    HPI:  Patient is a 45 year old Female with a past medical history of HLD not on statin, anxiety, herniated discs, recent covid 25 days ago and ended quarantine on 1/24th presented to the ER with a chief complaint of worsening SOB x 3 days. SOB is associated with pleuritic chest pain, dyspnea, tachypnea, leg tenderness but thought it was from herniated discs. Patient admits to prolong immobilization with COVID19 basically bedbound, varicose veins. Patient also admits to history of easy bruising but no workup as she thought it was from the use of anti-inflammatories. Patient denies factor V liden, protein c, s, anti-thrombin deficiencies, recent surgeries, joint replacements, major trauma, cancer, hormonal replacement (on menopause - 2016), previous thrombosis, antiphospholipid syndrome, kidney disease. Patient denies hemoptysis, tachycardia, leg swelling (only shin tenderness), fevers, chills, nausea, vomiting, abdominal pain, dysuria, constipation, diarrhea.     In ER, her vitals were stable and she was saturating 98% at RA. Labs were unremarkable. Xray Chest was unremarkable. CT PE protocol showed Small subsegmental pulmonary emboli in bilateral upper lobes and left lower lob. Patient was admitted for Subsegmental PE likely secondary to recent COVID infection.  (05 Feb 2021 19:02)      PAST MEDICAL & SURGICAL HISTORY:  Herniation of intervertebral disc of cervical spine due to degeneration    Chronic GERD    Lumbar disc herniation    Neckache    Backache symptom  CAUSING ARM/LEG TINGLING    Hypercholesteremia    Bruise  as per patient-  bruising after banging.    S/P bunionectomy        Cardiac Risks:   [ ]HTN, [ ] DM, [ ] Smoking, [x ] FH,  [x ] Lipids        MEDICATIONS:  MEDICATIONS  (STANDING):  cetirizine 10 milliGRAM(s) Oral every 24 hours  chlorhexidine 4% Liquid 1 Application(s) Topical <User Schedule>  gabapentin 300 milliGRAM(s) Oral three times a day  multivitamin 1 Tablet(s) Oral daily  rivaroxaban 15 milliGRAM(s) Oral two times a day with meals      FAMILY HISTORY:  Family history of DVT        SOCIAL HISTORY:      [ ] Marital status     Allergies    Potatoes (Hives; Angioedema)  prednisoLONE (Hives; Angioedema)        	    REVIEW OF SYSTEMS:  CONSTITUTIONAL: No fever, weight loss, or fatigue  EYES: No eye pain, visual disturbances, or discharge  ENMT:  No difficulty hearing, tinnitus, vertigo; No sinus or throat pain  NECK: No pain or stiffness  RESPIRATORY: No cough, wheezing, chills or hemoptysis; No Shortness of Breath  CARDIOVASCULAR: No chest pain, palpitations, passing out, dizziness, or leg swelling  GASTROINTESTINAL: No abdominal or epigastric pain. No nausea, vomiting, or hematemesis; No diarrhea or constipation. No melena or hematochezia.  GENITOURINARY: No dysuria, frequency, hematuria, or incontinence  NEUROLOGICAL: No headaches, memory loss, loss of strength, numbness, or tremors  SKIN: No itching, burning, rashes, or lesions   	        PHYSICAL EXAM:  T(C): 36.1 (02-08-21 @ 05:39), Max: 36.1 (02-07-21 @ 14:18)  HR: 49 (02-08-21 @ 05:39) (49 - 62)  BP: 91/55 (02-08-21 @ 05:39) (91/55 - 127/69)  RR: 16 (02-08-21 @ 05:39) (16 - 16)  SpO2: --  Wt(kg): --  I&O's Summary      Appearance: Normal	  Psychiatry: A & O x 3, Mood & affect appropriate  HEENT:   Normal oral mucosa, PERRL, EOMI	  Lymphatic: No lymphadenopathy  Cardiovascular: Normal S1 S2,RRR, No JVD, No murmurs  Respiratory: Lungs clear to auscultation	  Gastrointestinal:  Soft, Non-tender, + BS	  Skin: No rashes, No ecchymoses, No cyanosis	  Neurologic: Non-focal  Extremities: Normal range of motion, No clubbing, cyanosis or edema  Vascular: Peripheral pulses palpable 2+ bilaterally      ECG:  	  < from: 12 Lead ECG (02.06.21 @ 09:47) >    Diagnosis Line Sinus bradycardia  Nonspecific T wave abnormality  Abnormal ECG    Confirmed by HARVEY BRAGG MD ([id number]) on 2/6/2021 10:10:02 AM    < end of copied text >    	  LABS:	 	    CARDIAC MARKERS:                                
Patient is a 45y old  Female who presents with a chief complaint of Pulmonary Embolus (07 Feb 2021 10:21)      INTERVAL HISTORY/overnight events  docuPatient is a 45 year old Female with a past medical history of HLD not on statin, anxiety, herniated discs, recent covid 25 days ago and ended quarantine on 1/24th presented to the ER with a chief complaint of worsening SOB x 3 days. SOB is associated with retrosternal  chest pain, dyspnea, tachypnea, leg tenderness but thought it was from herniated discs. Patient admits to prolong immobilization with COVID19 basically bedbound, varicose veins. Patient also admits to history of easy bruising but no workup as she thought it was from the use of anti-inflammatories. Patient denies factor V liden, protein c, s, anti-thrombin deficiencies, recent surgeries, joint replacements, major trauma, cancer, hormonal replacement (on menopause - 2016), previous thrombosis, antiphospholipid syndrome, kidney disease. Patient denies hemoptysis, tachycardia, leg swelling (only shin tenderness), fevers, chills, nausea, vomiting, abdominal pain, dysuria, constipation, diarrhea.   as per the patient she felt better after the covid infection and then for the last 3-4 days prior to admission was complaining of chest pain describe retrosternal and tightness   SOB on exertion     Vital Signs Last 24 Hrs  T(C): 35.6 (07 Feb 2021 05:09), Max: 36.1 (06 Feb 2021 14:10)  T(F): 96.1 (07 Feb 2021 05:09), Max: 96.9 (06 Feb 2021 14:10)  HR: 52 (07 Feb 2021 05:09) (52 - 63)  BP: 100/61 (07 Feb 2021 05:09) (98/55 - 100/61)  BP(mean): --  RR: 16 (07 Feb 2021 05:09) (16 - 16)  SpO2: 97% (06 Feb 2021 18:07) (97% - 97%)  Daily     Daily   I&O's Summary      Physical Examination:    General: No acute distress.  Alert, oriented, interactive, nonfocal    HEENT: Pupils equal, reactive to light.  Symmetric.    PULM: Clear to auscultation bilaterally, no significant sputum production    CVS: Regular rate and rhythm, no murmurs, rubs, or gallops    ABD: Soft, nondistended, nontender, normoactive bowel sounds, no masses    EXT: No edema, nontender    SKIN: Warm and well perfused, no rashes noted.    Neurology:    Musculoskeletal : Move all extremety         Lab Results:                        12.3   5.25  )-----------( 246      ( 06 Feb 2021 07:21 )             36.4     06 Feb 2021 07:21    144    |  109    |  17     ----------------------------<  88     4.5     |  27     |  0.9      Ca    9.1        06 Feb 2021 07:21      PT/INR - ( 06 Feb 2021 07:21 )   PT: 13.10 sec;   INR: 1.14 ratio         PTT - ( 06 Feb 2021 07:21 )  PTT:43.4 sec    CARDIAC MARKERS ( 05 Feb 2021 12:46 )  x     / <0.01 ng/mL / x     / x     / x            Microbiology      Medication:  MEDICATIONS  (STANDING):  cetirizine 10 milliGRAM(s) Oral every 24 hours  chlorhexidine 4% Liquid 1 Application(s) Topical <User Schedule>  enoxaparin Injectable 80 milliGRAM(s) SubCutaneous every 12 hours  gabapentin 300 milliGRAM(s) Oral three times a day  multivitamin 1 Tablet(s) Oral daily    MEDICATIONS  (PRN):  acetaminophen   Tablet .. 650 milliGRAM(s) Oral every 8 hours PRN Mild Pain (1 - 3)  aluminum hydroxide/magnesium hydroxide/simethicone Suspension 30 milliLiter(s) Oral every 4 hours PRN Dyspepsia  ketorolac   Injectable 30 milliGRAM(s) IV Push every 8 hours PRN Severe Pain (7 - 10)        IMAGING STUDIES:  c< from: CT Angio Chest PE Protocol w/ IV Cont (02.05.21 @ 15:56) >  Small subsegmental pulmonary emboli in bilateral upper lobes and left lower lobe.  2. Bilateral subsegmental atelectasis.  3. Mild coronary artery disease.    < end of copied text >

## 2021-02-08 NOTE — CONSULT NOTE ADULT - ASSESSMENT
Impression:SOB secondary to PE     Plan:  doppler negative   echo   ok for to switch to oral AC  eliquis or Xarelto   need outpatient follow up   for hypercoagulable work up   most likely covid related   consider cardiology eval ct scan  coronary vessel disease 
Patient with elevated lipids past. She was trying diet .She has recent BRARIOS and chest pain. This appears secondary PE. She has by ct tr calcifications of coronaries. She agrees to be on statin. Check lipid profile lfts in about 2- 3 months out patient . When symptoms from pe better in 4-8 weeks consider stress echo. Ambulate if sob check sat ambulating. Continue xarelto for now

## 2021-02-08 NOTE — DISCHARGE NOTE NURSING/CASE MANAGEMENT/SOCIAL WORK - PATIENT PORTAL LINK FT
You can access the FollowMyHealth Patient Portal offered by [facility] by registering at the following website: [email or web address]. By joining [employer]’s FollowMyHealth portal, you will also be able to view your health information using other applications (apps) compatible with our system.

## 2021-02-08 NOTE — PROGRESS NOTE ADULT - ASSESSMENT
Patient is a 45 year old Female with a past medical history of HLD not on statin, anxiety, herniated discs, recent covid 25 days ago and ended quarantine on 1/24th presented to the ER with a chief complaint of worsening SOB x 3 days. CTA Chest revealing "Small subsegmental pulmonary emboli in bilateral upper lobes and left lower lobe." EKG Sinus zach. No tachycardia.     # Bilateral PE likely secondary to COVID infection   - Continue xarelto - CM determining most cost effective option for patient due to high copay  - LE doppler - negative for DVT  - Hemodynamically stable   - pulm consult appreciated  - echo - (Prelim) Normal LVF  - Toradol PRN     # HLD   - Not on statin - start upon d/c  - DASH diet   - outpatient follow up  - cardio eval appreciated - will need outpatient stress test in two months      Progress Note Handoff  Pending Consults: none  Pending Tests: echo  Pending Results: none  Family Discussion: discussed echo, cardiology and going home today, patient will ambulate on room air today with RN - pt in agreement   Disposition: Home_x____/SNF______/Other_____/Unknown at this time_____    Please call me with any questions at extension [phone]
Patient is a 45 year old Female with a past medical history of HLD not on statin, anxiety, herniated discs, recent covid 25 days ago and ended quarantine on 1/24th presented to the ER with a chief complaint of worsening SOB x 3 days. CTA Chest revealing "Small subsegmental pulmonary emboli in bilateral upper lobes and left lower lobe." EKG Sinus zach. No tachycardia.     # Bilateral PE likely secondary to Immobilization from COVID infection   - Continue with Lovenox - will switch to oral meds in am  - LE doppler - prelim negative   - Hemodynamically stable   - pulm consult pending  - Toradol PRN     # HLD   - Not on any statin   - DASH diet       Progress Note Handoff  Pending Consults: Pulm  Pending Tests: none  Pending Results: none  Family Discussion: discussed going home tomorrow, patient will ambulate on room air today with RN - pt in agreement   Disposition: Home_x____/SNF______/Other_____/Unknown at this time_____    Please call me with any questions at extension [phone]
Patient is a 45 year old Female with a past medical history of HLD not on statin, anxiety, herniated discs, recent covid 25 days ago and ended quarantine on 1/24th presented to the ER with a chief complaint of worsening SOB x 3 days. CTA Chest revealing "Small subsegmental pulmonary emboli in bilateral upper lobes and left lower lobe." EKG Sinus zach. No tachycardia.     # Bilateral PE likely secondary to COVID infection   - Continue with Lovenox - will switch to oral meds in am  - LE doppler - prelim negative   - Hemodynamically stable   - pulm consult pending  - check echo   - Toradol PRN     # HLD   - Not on statin   - DASH diet   - outpatient follow up      Progress Note Handoff  Pending Consults: Pulm  Pending Tests: echo  Pending Results: none  Family Discussion: discussed echo and going home tomorrow, patient will ambulate on room air today with RN - pt in agreement   Disposition: Home_x____/SNF______/Other_____/Unknown at this time_____    Please call me with any questions at extension [phone]

## 2021-02-08 NOTE — PROGRESS NOTE ADULT - SUBJECTIVE AND OBJECTIVE BOX
DEVORAMATIASJAQUAN  45y  Female      Patient is a 45y old female who presents with shortness of breath      INTERVAL HPI/OVERNIGHT EVENTS:  Patient seen and examined earlier this morning  Sitting comfortably in the chair. In NAD  Denies SOB at rest   Does complain of feeling "winded" after ambulation  Patient appeared upset after hearing that her medication would be sent to her pharmacy electronically.  The patient stated that someone promised her that her medication would be delivered bedside upon d/c.        REVIEW OF SYSTEMS:  CONSTITUTIONAL: No fever, weight loss, or fatigue  EYES: No eye pain, visual disturbances, or discharge  ENMT:  No difficulty hearing, tinnitus, vertigo; No sinus or throat pain  NECK: No pain or stiffness  RESPIRATORY: No cough, wheezing, chills or hemoptysis; No shortness of breath  CARDIOVASCULAR: No chest pain, palpitations, dizziness, or leg swelling  GASTROINTESTINAL: No abdominal or epigastric pain. No nausea, vomiting, or hematemesis; No diarrhea or constipation. No melena or hematochezia.  GENITOURINARY: No dysuria, frequency, hematuria, or incontinence  NEUROLOGICAL: No headaches, memory loss, loss of strength, numbness, or tremors  SKIN: No itching, burning, rashes, or lesions   LYMPH NODES: No enlarged glands  ENDOCRINE: No heat or cold intolerance; No hair loss  MUSCULOSKELETAL: No joint pain or swelling; No muscle, back, or extremity pain  PSYCHIATRIC: No depression, anxiety, mood swings, or difficulty sleeping  HEME/LYMPH: No easy bruising, or bleeding gums  ALLERY AND IMMUNOLOGIC: No hives or eczema      Vital Signs Last 24 Hrs  T(C): 36.1 (08 Feb 2021 05:39), Max: 36.1 (07 Feb 2021 14:18)  T(F): 96.9 (08 Feb 2021 05:39), Max: 96.9 (07 Feb 2021 14:18)  HR: 49 (08 Feb 2021 05:39) (49 - 62)  BP: 91/55 (08 Feb 2021 05:39) (91/55 - 127/69)  BP(mean): --  RR: 16 (08 Feb 2021 05:39) (16 - 16)  SpO2: --       PHYSICAL EXAM:  GENERAL: NAD, well-groomed, well-developed  HEAD:  Atraumatic, Normocephalic  EYES: EOMI, PERRLA, conjunctiva and sclera clear  ENMT: No tonsillar erythema, exudates, or enlargement; Moist mucous membranes, Good dentition, No lesions  NECK: Supple, No JVD, Normal thyroid  NERVOUS SYSTEM:  Alert & Oriented X3, Good concentration; Motor Strength 5/5 B/L upper and lower extremities; DTRs 2+ intact and symmetric  CHEST/LUNG: Clear to percussion bilaterally; No rales, rhonchi, wheezing, or rubs  HEART: Regular rate and rhythm; No murmurs, rubs, or gallops  ABDOMEN: Soft, Nontender, Nondistended; Bowel sounds present  EXTREMITIES:  2+ Peripheral Pulses, No clubbing, cyanosis, or edema  LYMPH: No lymphadenopathy noted  SKIN: No rashes or lesions    Consultant(s) Notes Reviewed:  [x ] YES  [ ] NO  Care Discussed with Consultants/Other Providers [ x] YES  [ ] NO    LAB:                        12.3   5.25  )-----------( 246      ( 06 Feb 2021 07:21 )             36.4     02-06    144  |  109  |  17  ----------------------------<  88  4.5   |  27  |  0.9    Ca    9.1      06 Feb 2021 07:21    TPro  6.0  /  Alb  3.7  /  TBili  0.3  /  DBili  x   /  AST  23  /  ALT  28  /  AlkPhos  98  02-06    LIVER FUNCTIONS - ( 06 Feb 2021 07:21 )  Alb: 3.7 g/dL / Pro: 6.0 g/dL / ALK PHOS: 98 U/L / ALT: 28 U/L / AST: 23 U/L / GGT: x           CARDIAC MARKERS ( 05 Feb 2021 12:46 )  x     / <0.01 ng/mL / x     / x     / x              Drug Dosing Weight  Height (cm): 165.1 (05 Feb 2021 23:21)  Weight (kg): 85.6 (05 Feb 2021 23:21)  BMI (kg/m2): 31.4 (05 Feb 2021 23:21)  BSA (m2): 1.93 (05 Feb 2021 23:21)      RADIOLOGY & ADDITIONAL TESTS:  Imaging Personally Reviewed:  [x] YES  [ ] NO    HEALTH ISSUES - PROBLEM Dx:  Pulmonary thromboembolism      MEDICATIONS  (STANDING):  atorvastatin 10 milliGRAM(s) Oral at bedtime  cetirizine 10 milliGRAM(s) Oral every 24 hours  chlorhexidine 4% Liquid 1 Application(s) Topical <User Schedule>  gabapentin 300 milliGRAM(s) Oral three times a day  multivitamin 1 Tablet(s) Oral daily  rivaroxaban 15 milliGRAM(s) Oral two times a day with meals    MEDICATIONS  (PRN):  acetaminophen   Tablet .. 650 milliGRAM(s) Oral every 8 hours PRN Mild Pain (1 - 3)  aluminum hydroxide/magnesium hydroxide/simethicone Suspension 30 milliLiter(s) Oral every 4 hours PRN Dyspepsia  ketorolac   Injectable 30 milliGRAM(s) IV Push every 8 hours PRN Severe Pain (7 - 10)        
  MARVINCARLOSJAQUAN  45y  Female      Patient is a 45y old female who presents with shortness of breath      INTERVAL HPI/OVERNIGHT EVENTS:  Patient seen and examined earlier this morning  Lying comfortably in bed. In NAD  Denies SOB at rest       REVIEW OF SYSTEMS:  CONSTITUTIONAL: No fever, weight loss, or fatigue  EYES: No eye pain, visual disturbances, or discharge  ENMT:  No difficulty hearing, tinnitus, vertigo; No sinus or throat pain  NECK: No pain or stiffness  RESPIRATORY: No cough, wheezing, chills or hemoptysis; No shortness of breath  CARDIOVASCULAR: No chest pain, palpitations, dizziness, or leg swelling  GASTROINTESTINAL: No abdominal or epigastric pain. No nausea, vomiting, or hematemesis; No diarrhea or constipation. No melena or hematochezia.  GENITOURINARY: No dysuria, frequency, hematuria, or incontinence  NEUROLOGICAL: No headaches, memory loss, loss of strength, numbness, or tremors  SKIN: No itching, burning, rashes, or lesions   LYMPH NODES: No enlarged glands  ENDOCRINE: No heat or cold intolerance; No hair loss  MUSCULOSKELETAL: No joint pain or swelling; No muscle, back, or extremity pain  PSYCHIATRIC: No depression, anxiety, mood swings, or difficulty sleeping  HEME/LYMPH: No easy bruising, or bleeding gums  ALLERY AND IMMUNOLOGIC: No hives or eczema    T(C): 36.2 (02-06-21 @ 05:21), Max: 36.6 (02-05-21 @ 19:43)  HR: 70 (02-06-21 @ 05:21) (55 - 70)  BP: 100/61 (02-06-21 @ 05:21) (100/61 - 119/83)  RR: 18 (02-06-21 @ 05:21) (18 - 18)  SpO2: 94% (02-06-21 @ 08:15) (94% - 100%) on ra at rest    PHYSICAL EXAM:  GENERAL: NAD, well-groomed, well-developed  HEAD:  Atraumatic, Normocephalic  EYES: EOMI, PERRLA, conjunctiva and sclera clear  ENMT: No tonsillar erythema, exudates, or enlargement; Moist mucous membranes, Good dentition, No lesions  NECK: Supple, No JVD, Normal thyroid  NERVOUS SYSTEM:  Alert & Oriented X3, Good concentration; Motor Strength 5/5 B/L upper and lower extremities; DTRs 2+ intact and symmetric  CHEST/LUNG: Clear to percussion bilaterally; No rales, rhonchi, wheezing, or rubs  HEART: Regular rate and rhythm; No murmurs, rubs, or gallops  ABDOMEN: Soft, Nontender, Nondistended; Bowel sounds present  EXTREMITIES:  2+ Peripheral Pulses, No clubbing, cyanosis, or edema  LYMPH: No lymphadenopathy noted  SKIN: No rashes or lesions    Consultant(s) Notes Reviewed:  [x ] YES  [ ] NO  Care Discussed with Consultants/Other Providers [ x] YES  [ ] NO    LAB:                        12.3   5.25  )-----------( 246      ( 06 Feb 2021 07:21 )             36.4     02-06    144  |  109  |  17  ----------------------------<  88  4.5   |  27  |  0.9    Ca    9.1      06 Feb 2021 07:21    TPro  6.0  /  Alb  3.7  /  TBili  0.3  /  DBili  x   /  AST  23  /  ALT  28  /  AlkPhos  98  02-06    LIVER FUNCTIONS - ( 06 Feb 2021 07:21 )  Alb: 3.7 g/dL / Pro: 6.0 g/dL / ALK PHOS: 98 U/L / ALT: 28 U/L / AST: 23 U/L / GGT: x           CARDIAC MARKERS ( 05 Feb 2021 12:46 )  x     / <0.01 ng/mL / x     / x     / x              Drug Dosing Weight  Height (cm): 165.1 (05 Feb 2021 23:21)  Weight (kg): 85.6 (05 Feb 2021 23:21)  BMI (kg/m2): 31.4 (05 Feb 2021 23:21)  BSA (m2): 1.93 (05 Feb 2021 23:21)      RADIOLOGY & ADDITIONAL TESTS:  Imaging Personally Reviewed:  [x] YES  [ ] NO    HEALTH ISSUES - PROBLEM Dx:  Suspected deep vein thrombosis (DVT)    Pulmonary thromboembolism            MEDS:  aluminum hydroxide/magnesium hydroxide/simethicone Suspension 30 milliLiter(s) Oral every 4 hours PRN  chlorhexidine 4% Liquid 1 Application(s) Topical <User Schedule>  enoxaparin Injectable 80 milliGRAM(s) SubCutaneous every 12 hours  gabapentin 300 milliGRAM(s) Oral three times a day  ketorolac   Injectable 30 milliGRAM(s) IV Push every 8 hours PRN  loratadine 10 milliGRAM(s) Oral daily  multivitamin 1 Tablet(s) Oral daily      
  LARS CORREIACY  45y  Female      Patient is a 45y old female who presents with shortness of breath      INTERVAL HPI/OVERNIGHT EVENTS:  Patient seen and examined earlier this morning  Lying comfortably in bed. In NAD  Denies SOB at rest   Does complain of cough and feeling "winded" after ambulation      REVIEW OF SYSTEMS:  CONSTITUTIONAL: No fever, weight loss, or fatigue  EYES: No eye pain, visual disturbances, or discharge  ENMT:  No difficulty hearing, tinnitus, vertigo; No sinus or throat pain  NECK: No pain or stiffness  RESPIRATORY: No cough, wheezing, chills or hemoptysis; No shortness of breath  CARDIOVASCULAR: No chest pain, palpitations, dizziness, or leg swelling  GASTROINTESTINAL: No abdominal or epigastric pain. No nausea, vomiting, or hematemesis; No diarrhea or constipation. No melena or hematochezia.  GENITOURINARY: No dysuria, frequency, hematuria, or incontinence  NEUROLOGICAL: No headaches, memory loss, loss of strength, numbness, or tremors  SKIN: No itching, burning, rashes, or lesions   LYMPH NODES: No enlarged glands  ENDOCRINE: No heat or cold intolerance; No hair loss  MUSCULOSKELETAL: No joint pain or swelling; No muscle, back, or extremity pain  PSYCHIATRIC: No depression, anxiety, mood swings, or difficulty sleeping  HEME/LYMPH: No easy bruising, or bleeding gums  ALLERY AND IMMUNOLOGIC: No hives or eczema      Vital Signs Last 24 Hrs  T(C): 35.6 (07 Feb 2021 05:09), Max: 36.1 (06 Feb 2021 14:10)  T(F): 96.1 (07 Feb 2021 05:09), Max: 96.9 (06 Feb 2021 14:10)  HR: 52 (07 Feb 2021 05:09) (52 - 63)  BP: 100/61 (07 Feb 2021 05:09) (98/55 - 100/61)  BP(mean): --  RR: 16 (07 Feb 2021 05:09) (16 - 16)  SpO2: 97% (06 Feb 2021 18:07) (97% - 97%) on ra at rest    PHYSICAL EXAM:  GENERAL: NAD, well-groomed, well-developed  HEAD:  Atraumatic, Normocephalic  EYES: EOMI, PERRLA, conjunctiva and sclera clear  ENMT: No tonsillar erythema, exudates, or enlargement; Moist mucous membranes, Good dentition, No lesions  NECK: Supple, No JVD, Normal thyroid  NERVOUS SYSTEM:  Alert & Oriented X3, Good concentration; Motor Strength 5/5 B/L upper and lower extremities; DTRs 2+ intact and symmetric  CHEST/LUNG: Clear to percussion bilaterally; No rales, rhonchi, wheezing, or rubs  HEART: Regular rate and rhythm; No murmurs, rubs, or gallops  ABDOMEN: Soft, Nontender, Nondistended; Bowel sounds present  EXTREMITIES:  2+ Peripheral Pulses, No clubbing, cyanosis, or edema  LYMPH: No lymphadenopathy noted  SKIN: No rashes or lesions    Consultant(s) Notes Reviewed:  [x ] YES  [ ] NO  Care Discussed with Consultants/Other Providers [ x] YES  [ ] NO    LAB:                        12.3   5.25  )-----------( 246      ( 06 Feb 2021 07:21 )             36.4     02-06    144  |  109  |  17  ----------------------------<  88  4.5   |  27  |  0.9    Ca    9.1      06 Feb 2021 07:21    TPro  6.0  /  Alb  3.7  /  TBili  0.3  /  DBili  x   /  AST  23  /  ALT  28  /  AlkPhos  98  02-06    LIVER FUNCTIONS - ( 06 Feb 2021 07:21 )  Alb: 3.7 g/dL / Pro: 6.0 g/dL / ALK PHOS: 98 U/L / ALT: 28 U/L / AST: 23 U/L / GGT: x           CARDIAC MARKERS ( 05 Feb 2021 12:46 )  x     / <0.01 ng/mL / x     / x     / x              Drug Dosing Weight  Height (cm): 165.1 (05 Feb 2021 23:21)  Weight (kg): 85.6 (05 Feb 2021 23:21)  BMI (kg/m2): 31.4 (05 Feb 2021 23:21)  BSA (m2): 1.93 (05 Feb 2021 23:21)      RADIOLOGY & ADDITIONAL TESTS:  Imaging Personally Reviewed:  [x] YES  [ ] NO    HEALTH ISSUES - PROBLEM Dx:  Pulmonary thromboembolism      MEDICATIONS  (STANDING):  cetirizine 10 milliGRAM(s) Oral every 24 hours  chlorhexidine 4% Liquid 1 Application(s) Topical <User Schedule>  enoxaparin Injectable 80 milliGRAM(s) SubCutaneous every 12 hours  gabapentin 300 milliGRAM(s) Oral three times a day  multivitamin 1 Tablet(s) Oral daily    MEDICATIONS  (PRN):  acetaminophen   Tablet .. 650 milliGRAM(s) Oral every 8 hours PRN Mild Pain (1 - 3)  aluminum hydroxide/magnesium hydroxide/simethicone Suspension 30 milliLiter(s) Oral every 4 hours PRN Dyspepsia  ketorolac   Injectable 30 milliGRAM(s) IV Push every 8 hours PRN Severe Pain (7 - 10)

## 2021-02-10 LAB — PROT C ACT/NOR PPP: 106 % — SIGNIFICANT CHANGE UP (ref 65–129)

## 2021-02-12 DIAGNOSIS — Z88.8 ALLERGY STATUS TO OTHER DRUGS, MEDICAMENTS AND BIOLOGICAL SUBSTANCES: ICD-10-CM

## 2021-02-12 DIAGNOSIS — K21.9 GASTRO-ESOPHAGEAL REFLUX DISEASE WITHOUT ESOPHAGITIS: ICD-10-CM

## 2021-02-12 DIAGNOSIS — E78.5 HYPERLIPIDEMIA, UNSPECIFIED: ICD-10-CM

## 2021-02-12 DIAGNOSIS — Z91.018 ALLERGY TO OTHER FOODS: ICD-10-CM

## 2021-02-12 DIAGNOSIS — B94.8 SEQUELAE OF OTHER SPECIFIED INFECTIOUS AND PARASITIC DISEASES: ICD-10-CM

## 2021-02-12 DIAGNOSIS — I26.99 OTHER PULMONARY EMBOLISM WITHOUT ACUTE COR PULMONALE: ICD-10-CM

## 2021-02-12 DIAGNOSIS — Z87.891 PERSONAL HISTORY OF NICOTINE DEPENDENCE: ICD-10-CM

## 2021-02-12 DIAGNOSIS — I26.93 SINGLE SUBSEGMENTAL PULMONARY EMBOLISM WITHOUT ACUTE COR PULMONALE: ICD-10-CM

## 2021-03-02 PROBLEM — Z00.00 ENCOUNTER FOR PREVENTIVE HEALTH EXAMINATION: Status: ACTIVE | Noted: 2021-03-02

## 2021-03-02 RX ORDER — RIVAROXABAN 20 MG/1
20 TABLET, FILM COATED ORAL DAILY
Qty: 30 | Refills: 3 | Status: ACTIVE | COMMUNITY
Start: 2021-03-02 | End: 1900-01-01

## 2021-03-26 ENCOUNTER — APPOINTMENT (OUTPATIENT)
Dept: HEMATOLOGY ONCOLOGY | Facility: CLINIC | Age: 46
End: 2021-03-26

## 2021-04-16 ENCOUNTER — APPOINTMENT (OUTPATIENT)
Dept: PULMONOLOGY | Facility: CLINIC | Age: 46
End: 2021-04-16

## 2021-05-10 ENCOUNTER — TRANSCRIPTION ENCOUNTER (OUTPATIENT)
Age: 46
End: 2021-05-10

## 2021-05-10 ENCOUNTER — OUTPATIENT (OUTPATIENT)
Dept: OUTPATIENT SERVICES | Facility: HOSPITAL | Age: 46
LOS: 1 days | Discharge: HOME | End: 2021-05-10
Payer: COMMERCIAL

## 2021-05-10 DIAGNOSIS — R06.89 OTHER ABNORMALITIES OF BREATHING: ICD-10-CM

## 2021-05-10 DIAGNOSIS — Z98.890 OTHER SPECIFIED POSTPROCEDURAL STATES: Chronic | ICD-10-CM

## 2021-05-10 PROCEDURE — 71046 X-RAY EXAM CHEST 2 VIEWS: CPT | Mod: 26

## 2021-05-10 PROCEDURE — 78580 LUNG PERFUSION IMAGING: CPT | Mod: 26

## 2021-07-30 ENCOUNTER — EMERGENCY (EMERGENCY)
Facility: HOSPITAL | Age: 46
LOS: 0 days | Discharge: HOME | End: 2021-07-30
Attending: EMERGENCY MEDICINE | Admitting: EMERGENCY MEDICINE
Payer: COMMERCIAL

## 2021-07-30 VITALS
OXYGEN SATURATION: 95 % | WEIGHT: 190.04 LBS | SYSTOLIC BLOOD PRESSURE: 120 MMHG | HEART RATE: 85 BPM | HEIGHT: 65 IN | DIASTOLIC BLOOD PRESSURE: 78 MMHG | RESPIRATION RATE: 18 BRPM | TEMPERATURE: 97 F

## 2021-07-30 VITALS
OXYGEN SATURATION: 98 % | HEART RATE: 84 BPM | TEMPERATURE: 98 F | RESPIRATION RATE: 18 BRPM | DIASTOLIC BLOOD PRESSURE: 76 MMHG | SYSTOLIC BLOOD PRESSURE: 105 MMHG

## 2021-07-30 DIAGNOSIS — M79.10 MYALGIA, UNSPECIFIED SITE: ICD-10-CM

## 2021-07-30 DIAGNOSIS — R07.9 CHEST PAIN, UNSPECIFIED: ICD-10-CM

## 2021-07-30 DIAGNOSIS — Z20.822 CONTACT WITH AND (SUSPECTED) EXPOSURE TO COVID-19: ICD-10-CM

## 2021-07-30 DIAGNOSIS — Z91.018 ALLERGY TO OTHER FOODS: ICD-10-CM

## 2021-07-30 DIAGNOSIS — Z79.899 OTHER LONG TERM (CURRENT) DRUG THERAPY: ICD-10-CM

## 2021-07-30 DIAGNOSIS — Z86.711 PERSONAL HISTORY OF PULMONARY EMBOLISM: ICD-10-CM

## 2021-07-30 DIAGNOSIS — Z86.16 PERSONAL HISTORY OF COVID-19: ICD-10-CM

## 2021-07-30 DIAGNOSIS — R09.81 NASAL CONGESTION: ICD-10-CM

## 2021-07-30 DIAGNOSIS — Z88.8 ALLERGY STATUS TO OTHER DRUGS, MEDICAMENTS AND BIOLOGICAL SUBSTANCES: ICD-10-CM

## 2021-07-30 DIAGNOSIS — Z79.01 LONG TERM (CURRENT) USE OF ANTICOAGULANTS: ICD-10-CM

## 2021-07-30 DIAGNOSIS — R05 COUGH: ICD-10-CM

## 2021-07-30 DIAGNOSIS — Z98.890 OTHER SPECIFIED POSTPROCEDURAL STATES: Chronic | ICD-10-CM

## 2021-07-30 LAB
ANION GAP SERPL CALC-SCNC: 15 MMOL/L — HIGH (ref 7–14)
BASOPHILS # BLD AUTO: 0.03 K/UL — SIGNIFICANT CHANGE UP (ref 0–0.2)
BASOPHILS NFR BLD AUTO: 0.5 % — SIGNIFICANT CHANGE UP (ref 0–1)
BUN SERPL-MCNC: 15 MG/DL — SIGNIFICANT CHANGE UP (ref 10–20)
CALCIUM SERPL-MCNC: 9.4 MG/DL — SIGNIFICANT CHANGE UP (ref 8.5–10.1)
CHLORIDE SERPL-SCNC: 105 MMOL/L — SIGNIFICANT CHANGE UP (ref 98–110)
CO2 SERPL-SCNC: 23 MMOL/L — SIGNIFICANT CHANGE UP (ref 17–32)
CREAT SERPL-MCNC: 0.8 MG/DL — SIGNIFICANT CHANGE UP (ref 0.7–1.5)
EOSINOPHIL # BLD AUTO: 0.11 K/UL — SIGNIFICANT CHANGE UP (ref 0–0.7)
EOSINOPHIL NFR BLD AUTO: 2 % — SIGNIFICANT CHANGE UP (ref 0–8)
GLUCOSE SERPL-MCNC: 97 MG/DL — SIGNIFICANT CHANGE UP (ref 70–99)
HCG SERPL-ACNC: 1.6 MIU/ML — SIGNIFICANT CHANGE UP
HCT VFR BLD CALC: 39.8 % — SIGNIFICANT CHANGE UP (ref 37–47)
HGB BLD-MCNC: 13.1 G/DL — SIGNIFICANT CHANGE UP (ref 12–16)
IMM GRANULOCYTES NFR BLD AUTO: 0.2 % — SIGNIFICANT CHANGE UP (ref 0.1–0.3)
LYMPHOCYTES # BLD AUTO: 2.03 K/UL — SIGNIFICANT CHANGE UP (ref 1.2–3.4)
LYMPHOCYTES # BLD AUTO: 36.6 % — SIGNIFICANT CHANGE UP (ref 20.5–51.1)
MCHC RBC-ENTMCNC: 30.5 PG — SIGNIFICANT CHANGE UP (ref 27–31)
MCHC RBC-ENTMCNC: 32.9 G/DL — SIGNIFICANT CHANGE UP (ref 32–37)
MCV RBC AUTO: 92.8 FL — SIGNIFICANT CHANGE UP (ref 81–99)
MONOCYTES # BLD AUTO: 0.61 K/UL — HIGH (ref 0.1–0.6)
MONOCYTES NFR BLD AUTO: 11 % — HIGH (ref 1.7–9.3)
NEUTROPHILS # BLD AUTO: 2.76 K/UL — SIGNIFICANT CHANGE UP (ref 1.4–6.5)
NEUTROPHILS NFR BLD AUTO: 49.7 % — SIGNIFICANT CHANGE UP (ref 42.2–75.2)
NRBC # BLD: 0 /100 WBCS — SIGNIFICANT CHANGE UP (ref 0–0)
PLATELET # BLD AUTO: 235 K/UL — SIGNIFICANT CHANGE UP (ref 130–400)
POTASSIUM SERPL-MCNC: 4.3 MMOL/L — SIGNIFICANT CHANGE UP (ref 3.5–5)
POTASSIUM SERPL-SCNC: 4.3 MMOL/L — SIGNIFICANT CHANGE UP (ref 3.5–5)
RBC # BLD: 4.29 M/UL — SIGNIFICANT CHANGE UP (ref 4.2–5.4)
RBC # FLD: 12.2 % — SIGNIFICANT CHANGE UP (ref 11.5–14.5)
SARS-COV-2 RNA SPEC QL NAA+PROBE: SIGNIFICANT CHANGE UP
SODIUM SERPL-SCNC: 143 MMOL/L — SIGNIFICANT CHANGE UP (ref 135–146)
WBC # BLD: 5.55 K/UL — SIGNIFICANT CHANGE UP (ref 4.8–10.8)
WBC # FLD AUTO: 5.55 K/UL — SIGNIFICANT CHANGE UP (ref 4.8–10.8)

## 2021-07-30 PROCEDURE — 71275 CT ANGIOGRAPHY CHEST: CPT | Mod: 26,MA

## 2021-07-30 PROCEDURE — 71045 X-RAY EXAM CHEST 1 VIEW: CPT | Mod: 26

## 2021-07-30 PROCEDURE — 93010 ELECTROCARDIOGRAM REPORT: CPT | Mod: NC

## 2021-07-30 PROCEDURE — 99285 EMERGENCY DEPT VISIT HI MDM: CPT

## 2021-07-30 RX ORDER — TIZANIDINE 4 MG/1
2 TABLET ORAL
Qty: 0 | Refills: 0 | DISCHARGE

## 2021-07-30 NOTE — ED ADULT TRIAGE NOTE - CHIEF COMPLAINT QUOTE
pt c/o congestion, body aches, chills, fevers and +exposure to covid. pt states I was positive in January pt c/o congestion, body aches, chills, cough and +exposure to covid. pt states I was positive in January

## 2021-07-30 NOTE — ED PROVIDER NOTE - PHYSICAL EXAMINATION
Gen: Alert, NAD, well appearing  Head: NC, AT, PERRL, EOMI  ENT: Sinus pressure with palpation, + clear nasal discharge, +boggy turbinates  Pulm: Bilateral BS, normal resp effort  CV: RRR  Abd: soft, NT/ND  Neuro: AAOx3

## 2021-07-30 NOTE — ED PROVIDER NOTE - OBJECTIVE STATEMENT
Patient is a 46 years old F pmhx COVID, PE xarelto stopped 5/2021 after normal VQ presents to the ED for evaluation of nasal congestion, sneezing, cough productive of white sputum, and generalized unwellness for the past two weeks worse in the past two days now associated with chills but no fever.  + sick contact with COVID + individual this week.  No headache, no fever, no sore throat, no shortness of breath, admits to chest pain that moves from left to right side of chest wall and worse when she lays on her side.

## 2021-07-30 NOTE — ED PROVIDER NOTE - NS ED ROS FT
Review of Systems    Constitutional: (-) fever  Eyes/ENT: (-) blurry vision, (-) epistaxis  Cardiovascular: (-) chest pain, (-) syncope  Respiratory: (-) cough, (-) shortness of breath  Gastrointestinal: (-) vomiting, (-) diarrhea  Musculoskeletal: (-) neck pain, (-) back pain, (-) joint pain  Integumentary: (-) rash  Neurological: (-) headache

## 2021-07-30 NOTE — ED ADULT NURSE NOTE - CHPI ED NUR SYMPTOMS POS
congestion, chills bodyaches that began Weds, states had sick contact on Sunday that was diagnosed with Covid/COUGH

## 2021-07-30 NOTE — ED PROVIDER NOTE - PATIENT PORTAL LINK FT
You can access the FollowMyHealth Patient Portal offered by [facility] by registering at the following website: [email or web address]. By joining [employer]’s FollowMyHealth portal, you will also be able to view your health information using other applications (apps) compatible with our system.

## 2021-07-30 NOTE — ED ADULT TRIAGE NOTE - CCCP TRG CHIEF CMPLNT
chills, body aches, fevers, exposure to covid/congestion chills, body aches, cough, exposure to covid/congestion

## 2021-07-30 NOTE — ED PROVIDER NOTE - ATTENDING CONTRIBUTION TO CARE
I personally evaluated the patient. I reviewed the Resident’s or Physician Assistant’s note (as assigned above), and agree with the findings and plan except as documented in my note.     46 female here for left sided chest discomfort with URI symptoms. COVID diagnosis in 1/2021 with subsequent pulmonary embolism and placement on NOAC with discharge of prescription after VQ scan showed resolution. Is now symptomatic with exertional dyspnea at times.     ROS otherwise unremarkable    PE: female in no distress. CV: pulses intact. CHEST: normal work of breathing. ABD: nondistended. SKIN: normal. EXT: FROM. NEURO: AAO 3 no focal deficits. HEENT: mucosa normal     Impression: chest pain     Plan: IV labs imaging supportive care and reevaluation

## 2021-07-30 NOTE — ED ADULT NURSE NOTE - CHIEF COMPLAINT QUOTE
pt c/o congestion, body aches, chills, cough and +exposure to covid. pt states I was positive in January

## 2021-07-30 NOTE — ED PROVIDER NOTE - SHIFT CHANGE DETAILS
Continue management and disposition. Plan is for CTPA in ED to eval for recurrent pulmonary embolism in setting of NOAC discharge post COVID related hypercoagulability.

## 2021-07-30 NOTE — ED ADULT NURSE NOTE - PMH
Backache symptom  CAUSING ARM/LEG TINGLING  Bruise  as per patient-  bruising after banging.  Chronic GERD    Herniation of intervertebral disc of cervical spine due to degeneration    Hypercholesteremia    Lumbar disc herniation    Neckache
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
## 2021-11-04 ENCOUNTER — TRANSCRIPTION ENCOUNTER (OUTPATIENT)
Age: 46
End: 2021-11-04

## 2021-11-10 ENCOUNTER — EMERGENCY (EMERGENCY)
Facility: HOSPITAL | Age: 46
LOS: 0 days | Discharge: HOME | End: 2021-11-10
Attending: EMERGENCY MEDICINE | Admitting: EMERGENCY MEDICINE
Payer: COMMERCIAL

## 2021-11-10 VITALS
RESPIRATION RATE: 18 BRPM | OXYGEN SATURATION: 100 % | DIASTOLIC BLOOD PRESSURE: 76 MMHG | HEIGHT: 65 IN | SYSTOLIC BLOOD PRESSURE: 135 MMHG | HEART RATE: 68 BPM

## 2021-11-10 VITALS — TEMPERATURE: 98 F

## 2021-11-10 DIAGNOSIS — E78.5 HYPERLIPIDEMIA, UNSPECIFIED: ICD-10-CM

## 2021-11-10 DIAGNOSIS — Z20.822 CONTACT WITH AND (SUSPECTED) EXPOSURE TO COVID-19: ICD-10-CM

## 2021-11-10 DIAGNOSIS — R06.02 SHORTNESS OF BREATH: ICD-10-CM

## 2021-11-10 DIAGNOSIS — Z98.890 OTHER SPECIFIED POSTPROCEDURAL STATES: Chronic | ICD-10-CM

## 2021-11-10 DIAGNOSIS — R07.89 OTHER CHEST PAIN: ICD-10-CM

## 2021-11-10 DIAGNOSIS — Z91.018 ALLERGY TO OTHER FOODS: ICD-10-CM

## 2021-11-10 DIAGNOSIS — Z86.711 PERSONAL HISTORY OF PULMONARY EMBOLISM: ICD-10-CM

## 2021-11-10 DIAGNOSIS — Z87.891 PERSONAL HISTORY OF NICOTINE DEPENDENCE: ICD-10-CM

## 2021-11-10 DIAGNOSIS — Z88.8 ALLERGY STATUS TO OTHER DRUGS, MEDICAMENTS AND BIOLOGICAL SUBSTANCES STATUS: ICD-10-CM

## 2021-11-10 DIAGNOSIS — Z86.16 PERSONAL HISTORY OF COVID-19: ICD-10-CM

## 2021-11-10 DIAGNOSIS — M54.9 DORSALGIA, UNSPECIFIED: ICD-10-CM

## 2021-11-10 DIAGNOSIS — G89.29 OTHER CHRONIC PAIN: ICD-10-CM

## 2021-11-10 LAB
ALBUMIN SERPL ELPH-MCNC: 4.3 G/DL — SIGNIFICANT CHANGE UP (ref 3.5–5.2)
ALP SERPL-CCNC: 102 U/L — SIGNIFICANT CHANGE UP (ref 30–115)
ALT FLD-CCNC: 26 U/L — SIGNIFICANT CHANGE UP (ref 0–41)
ANION GAP SERPL CALC-SCNC: 14 MMOL/L — SIGNIFICANT CHANGE UP (ref 7–14)
AST SERPL-CCNC: 22 U/L — SIGNIFICANT CHANGE UP (ref 0–41)
BASOPHILS # BLD AUTO: 0.03 K/UL — SIGNIFICANT CHANGE UP (ref 0–0.2)
BASOPHILS NFR BLD AUTO: 0.7 % — SIGNIFICANT CHANGE UP (ref 0–1)
BILIRUB SERPL-MCNC: 0.5 MG/DL — SIGNIFICANT CHANGE UP (ref 0.2–1.2)
BUN SERPL-MCNC: 17 MG/DL — SIGNIFICANT CHANGE UP (ref 10–20)
CALCIUM SERPL-MCNC: 9.6 MG/DL — SIGNIFICANT CHANGE UP (ref 8.5–10.1)
CHLORIDE SERPL-SCNC: 105 MMOL/L — SIGNIFICANT CHANGE UP (ref 98–110)
CO2 SERPL-SCNC: 21 MMOL/L — SIGNIFICANT CHANGE UP (ref 17–32)
CREAT SERPL-MCNC: 0.7 MG/DL — SIGNIFICANT CHANGE UP (ref 0.7–1.5)
EOSINOPHIL # BLD AUTO: 0.1 K/UL — SIGNIFICANT CHANGE UP (ref 0–0.7)
EOSINOPHIL NFR BLD AUTO: 2.3 % — SIGNIFICANT CHANGE UP (ref 0–8)
GLUCOSE SERPL-MCNC: 90 MG/DL — SIGNIFICANT CHANGE UP (ref 70–99)
HCG SERPL-ACNC: <0.6 MIU/ML — SIGNIFICANT CHANGE UP
HCT VFR BLD CALC: 40.8 % — SIGNIFICANT CHANGE UP (ref 37–47)
HGB BLD-MCNC: 13.5 G/DL — SIGNIFICANT CHANGE UP (ref 12–16)
IMM GRANULOCYTES NFR BLD AUTO: 0.5 % — HIGH (ref 0.1–0.3)
LYMPHOCYTES # BLD AUTO: 1.71 K/UL — SIGNIFICANT CHANGE UP (ref 1.2–3.4)
LYMPHOCYTES # BLD AUTO: 39.6 % — SIGNIFICANT CHANGE UP (ref 20.5–51.1)
MCHC RBC-ENTMCNC: 30.8 PG — SIGNIFICANT CHANGE UP (ref 27–31)
MCHC RBC-ENTMCNC: 33.1 G/DL — SIGNIFICANT CHANGE UP (ref 32–37)
MCV RBC AUTO: 93.2 FL — SIGNIFICANT CHANGE UP (ref 81–99)
MONOCYTES # BLD AUTO: 0.49 K/UL — SIGNIFICANT CHANGE UP (ref 0.1–0.6)
MONOCYTES NFR BLD AUTO: 11.3 % — HIGH (ref 1.7–9.3)
NEUTROPHILS # BLD AUTO: 1.97 K/UL — SIGNIFICANT CHANGE UP (ref 1.4–6.5)
NEUTROPHILS NFR BLD AUTO: 45.6 % — SIGNIFICANT CHANGE UP (ref 42.2–75.2)
NRBC # BLD: 0 /100 WBCS — SIGNIFICANT CHANGE UP (ref 0–0)
PLATELET # BLD AUTO: 235 K/UL — SIGNIFICANT CHANGE UP (ref 130–400)
POTASSIUM SERPL-MCNC: 4.7 MMOL/L — SIGNIFICANT CHANGE UP (ref 3.5–5)
POTASSIUM SERPL-SCNC: 4.7 MMOL/L — SIGNIFICANT CHANGE UP (ref 3.5–5)
PROT SERPL-MCNC: 7 G/DL — SIGNIFICANT CHANGE UP (ref 6–8)
RAPID RVP RESULT: SIGNIFICANT CHANGE UP
RBC # BLD: 4.38 M/UL — SIGNIFICANT CHANGE UP (ref 4.2–5.4)
RBC # FLD: 12.1 % — SIGNIFICANT CHANGE UP (ref 11.5–14.5)
SARS-COV-2 RNA SPEC QL NAA+PROBE: SIGNIFICANT CHANGE UP
SODIUM SERPL-SCNC: 140 MMOL/L — SIGNIFICANT CHANGE UP (ref 135–146)
TROPONIN T SERPL-MCNC: <0.01 NG/ML — SIGNIFICANT CHANGE UP
WBC # BLD: 4.32 K/UL — LOW (ref 4.8–10.8)
WBC # FLD AUTO: 4.32 K/UL — LOW (ref 4.8–10.8)

## 2021-11-10 PROCEDURE — 93010 ELECTROCARDIOGRAM REPORT: CPT

## 2021-11-10 PROCEDURE — 71275 CT ANGIOGRAPHY CHEST: CPT | Mod: 26,MA

## 2021-11-10 PROCEDURE — 99285 EMERGENCY DEPT VISIT HI MDM: CPT

## 2021-11-10 PROCEDURE — 71045 X-RAY EXAM CHEST 1 VIEW: CPT | Mod: 26

## 2021-11-10 RX ORDER — ALBUTEROL 90 UG/1
1 AEROSOL, METERED ORAL ONCE
Refills: 0 | Status: COMPLETED | OUTPATIENT
Start: 2021-11-10 | End: 2021-11-10

## 2021-11-10 RX ADMIN — ALBUTEROL 1 PUFF(S): 90 AEROSOL, METERED ORAL at 11:43

## 2021-11-10 NOTE — ED PROVIDER NOTE - PATIENT PORTAL LINK FT
You can access the FollowMyHealth Patient Portal offered by [facility] by registering at the following website: [email or web address]. By joining [employer]’s FollowMyHealth portal, you will also be able to view your health information using other applications (apps) compatible with our system.

## 2021-11-10 NOTE — ED PROVIDER NOTE - OBJECTIVE STATEMENT
46 y.o. female with a PMH of GERD, PE, hyperlipidemia, and chronic low back pain presented to the ER c/o worsening SOB for the past two months.  Pt seen and cleared by cardiology and was told this was not a cardiac issue.  Pt following with pulmonary and just started on a new inhaler.  Pt presently not on any anticoagulation.  (+) h/o previous Covid-19 infection.  Pt denies fever, chills, dizziness, nausea, vomiting, diaphoresis, lower extremity pain/swelling.  (+) dry cough.  No other complaints.

## 2021-11-10 NOTE — ED PROVIDER NOTE - CARE PROVIDER_API CALL
Your Pulmonologist,   Phone: (   )    -  Fax: (   )    -  Follow Up Time: 1-3 Days    Your Cardiologist,   Phone: (   )    -  Fax: (   )    -  Follow Up Time:

## 2021-11-10 NOTE — ED PROVIDER NOTE - NSFOLLOWUPINSTRUCTIONS_ED_ALL_ED_FT
Dyspnea    WHAT YOU NEED TO KNOW:    Dyspnea is breathing difficulty or discomfort. You may have labored, painful, or shallow breathing. You may feel breathless or short of breath. Dyspnea can occur during rest or with activity. You may have dyspnea for a short time, or it might become chronic. Dyspnea is often a symptom of a disease or condition.    DISCHARGE INSTRUCTIONS:    Return to the emergency department if:   •Your signs and symptoms are the same or worse within 24 hours of treatment.       •You have shaking chills or a fever over 102°F.       •You have new pain, pressure, or tightness in your chest.       •You have a new or worse cough or wheezing, or you cough up blood.      •You feel like you cannot get enough air.      •The skin over your ribs or on your neck sinks in when you breathe.       •You have a severe headache with vomiting and abdominal pain.       •You feel confused or dizzy.      Call your doctor or specialist if:   •You have questions or concerns about your condition or care.          Medicines:    •Medicines may be used to treat the cause of your dyspnea. Medicines may reduce swelling in your airway or decrease extra fluid from around your heart or lungs. Other medicines may be used to decrease anxiety and help you feel calm and relaxed.      •Take your medicine as directed. Contact your healthcare provider if you think your medicine is not helping or if you have side effects. Tell him or her if you are allergic to any medicine. Keep a list of the medicines, vitamins, and herbs you take. Include the amounts, and when and why you take them. Bring the list or the pill bottles to follow-up visits. Carry your medicine list with you in case of an emergency.      Manage long-term dyspnea:   •Create an action plan. You and your healthcare provider can work together to create a plan for how to handle episodes of dyspnea. The plan can include daily activities, treatment changes, and what to do if you have severe breathing problems.      •Lean forward on your elbows when you sit. This helps your lungs expand and may make it easier to breathe.      •Use pursed-lip breathing any time you feel short of breath. Breathe in through your nose and then slowly breathe out through your mouth with your lips slightly puckered. It should take you twice as long to breathe out as it did to breathe in.  Breathe in Breathe out           •Do not smoke. Nicotine and other chemicals in cigarettes and cigars can cause lung damage and make it harder to breathe. Ask your healthcare provider for information if you currently smoke and need help to quit. E-cigarettes or smokeless tobacco still contain nicotine. Talk to your healthcare provider before you use these products.       •Reach or maintain a healthy weight. Your healthcare provider can help you create a safe weight loss plan if you are overweight.      •Exercise as directed. Exercise can help your lungs work more easily. Exercise can also help you lose weight if needed. Try to get at least 30 minutes of exercise most days of the week. Your healthcare provider can help you create an exercise plan that is safe for you.      Follow up with your doctor or specialist as directed: Write down your questions so you remember to ask them during your visits.       © Copyright [employer] 2021           back to top                          © Copyright [employer] 2021

## 2021-11-10 NOTE — ED ADULT NURSE NOTE - OBJECTIVE STATEMENT
SOB, pt reports hx of PEs, pt reports mold in her house and pt is allergic to mold. Pt went to her pulmonologist and was not happy with her doctor and got no answers.

## 2021-11-10 NOTE — ED PROVIDER NOTE - PROVIDER TOKENS
FREE:[LAST:[Your Pulmonologist],PHONE:[(   )    -],FAX:[(   )    -],FOLLOWUP:[1-3 Days]],FREE:[LAST:[Your Cardiologist],PHONE:[(   )    -],FAX:[(   )    -]]

## 2021-11-10 NOTE — ED PROVIDER NOTE - ATTENDING CONTRIBUTION TO CARE
45 yo female  PMD COVID this spring , developed PE , not on any blood thinner at present, DJD, GERD, elevated cholesterol c/o SOB  for several months, now getting worse,  She was evaluated by her cardiologist who reportedly told her this is not cardiac issue, she is also in the care of a pulmonologist who prescribed  inhaled steroids.  Patient denies any fever, chills, leg pain or swelling, no hemoptysis, no night sweats.  She reports that symptoms are typically worse at the end of a day and worse when taking deep breaths.  Well-appearing well-nourished female in NAD, head AT/NC, PERRL, pink conjunctivae,  mmm, nml oropharynx, nml phonation without drooling or trismus, supple neck without midline spine ttp, nml work of breathing, lungs CTA b/l, equal air entry, speaking full sentences,  RRR, well-perfused extremities, distal pulses intact, abdomen soft, NT/ND, BS present in all quadrants, no midline spine or CVA ttp, no leg edema or unilateral calf swelling, A&Ox3, no focal neuro deficits, nml mood and affect.  Labs, imaging, plan d/c if negative.  Patient is amenable with the plan.

## 2021-11-10 NOTE — ED PROVIDER NOTE - PROGRESS NOTE DETAILS
EP;  Work up is negative , patient was advised to f/u with her pulmonologist for further management.  Patient to be discharged from ED. Any available test results were discussed with patient and/or family. Verbal instructions given, including instructions to return to ED immediately for any new, worsening, or concerning symptoms. Patient endorsed understanding. Written discharge instructions additionally given, including follow-up plan.  Patient was given opportunity to ask questions.

## 2022-06-24 ENCOUNTER — EMERGENCY (EMERGENCY)
Facility: HOSPITAL | Age: 47
LOS: 0 days | Discharge: HOME | End: 2022-06-24
Attending: EMERGENCY MEDICINE | Admitting: EMERGENCY MEDICINE
Payer: COMMERCIAL

## 2022-06-24 VITALS
OXYGEN SATURATION: 99 % | WEIGHT: 184.97 LBS | HEART RATE: 696 BPM | DIASTOLIC BLOOD PRESSURE: 81 MMHG | HEIGHT: 65 IN | SYSTOLIC BLOOD PRESSURE: 134 MMHG | TEMPERATURE: 97 F | RESPIRATION RATE: 18 BRPM

## 2022-06-24 DIAGNOSIS — R00.1 BRADYCARDIA, UNSPECIFIED: ICD-10-CM

## 2022-06-24 DIAGNOSIS — Z91.018 ALLERGY TO OTHER FOODS: ICD-10-CM

## 2022-06-24 DIAGNOSIS — E78.00 PURE HYPERCHOLESTEROLEMIA, UNSPECIFIED: ICD-10-CM

## 2022-06-24 DIAGNOSIS — K21.9 GASTRO-ESOPHAGEAL REFLUX DISEASE WITHOUT ESOPHAGITIS: ICD-10-CM

## 2022-06-24 DIAGNOSIS — Z98.890 OTHER SPECIFIED POSTPROCEDURAL STATES: Chronic | ICD-10-CM

## 2022-06-24 DIAGNOSIS — R05.1 ACUTE COUGH: ICD-10-CM

## 2022-06-24 DIAGNOSIS — R09.81 NASAL CONGESTION: ICD-10-CM

## 2022-06-24 PROCEDURE — 99284 EMERGENCY DEPT VISIT MOD MDM: CPT

## 2022-06-24 PROCEDURE — 93010 ELECTROCARDIOGRAM REPORT: CPT

## 2022-06-24 PROCEDURE — 71045 X-RAY EXAM CHEST 1 VIEW: CPT | Mod: 26

## 2022-06-24 RX ORDER — ALBUTEROL 90 UG/1
2 AEROSOL, METERED ORAL
Qty: 1 | Refills: 0
Start: 2022-06-24 | End: 2022-06-30

## 2022-06-24 NOTE — ED PROVIDER NOTE - CLINICAL SUMMARY MEDICAL DECISION MAKING FREE TEXT BOX
46 yo female, pmh of gerd here with 1 week of cough, nonproductive. no sob or fever. started with sinus congesiton. mild chest discomfort only when she coughs. no exertional cp or sob. on exam, nontoxic, vss   Gen: Alert, NAD  Skin: Warm, dry, intact  Head: NCAT  ENT: Mucous membranes moist  Neck: Supple  CV: RRR, normal S1, S2, no m/r/g  Resp: Non labored respirations, Lungs CTA b/l, no wheezes, rales, rhonchi  Abdomen: Soft, nondistended, nontender  Extremities: Moving all extremities, no edema  Neuro: No focal neuro deficits  Psych: Cooperative     ekg reassuring, cxr clear. appears to have URI sxs. clincially not concerned for acute cardiac or pulm emergency. In my opinion, based on current evaluation and results, an acute medical or surgical emergency does not appear to be occurring at this time and I feel that the pt is stable for further outpatient work up and/or treatment. Return precautions discussed.

## 2022-06-24 NOTE — ED PROVIDER NOTE - OBJECTIVE STATEMENT
46 yo female, pmh of gerd presents to ed for cough, x 1 week, started with sinus pressure and nasal congestion, non productive, mild, no pain or radiation. Denies fever, chills, cp, abd pain, nvd, syncope, cough.

## 2022-06-24 NOTE — ED PROVIDER NOTE - PHYSICAL EXAMINATION
Physical Exam    Vital Signs: I have reviewed the initial vital signs.  Constitutional: appears stated age, no acute distress  Eyes: Conjunctiva pink, Sclera clear,  Cardiovascular: S1 and S2, regular rate, regular rhythm, well-perfused extremities, radial pulses equal and 2+, pedal pulses 2+ and equal  Respiratory: unlabored respiratory effort, clear to auscultation bilaterally no wheezing, rales and rhonchi  Gastrointestinal: soft, non-tender abdomen, no pulsatile mass, normal bowl sounds  Musculoskeletal: supple neck, no lower extremity edema, no midline tenderness  Integumentary: warm, dry, no rash  Neurologic: awake, alert, nvi

## 2022-06-24 NOTE — ED PROVIDER NOTE - PATIENT PORTAL LINK FT
You can access the FollowMyHealth Patient Portal offered by [facility] by registering at the following website: [email or web address]. By joining [employer]’s FollowMyHealth portal, you will also be able to view your health information using other applications (apps) compatible with our system.

## 2022-08-02 ENCOUNTER — APPOINTMENT (OUTPATIENT)
Age: 47
End: 2022-08-02

## 2022-11-05 ENCOUNTER — NON-APPOINTMENT (OUTPATIENT)
Age: 47
End: 2022-11-05

## 2022-12-31 ENCOUNTER — EMERGENCY (EMERGENCY)
Facility: HOSPITAL | Age: 47
LOS: 0 days | Discharge: HOME | End: 2022-12-31
Attending: STUDENT IN AN ORGANIZED HEALTH CARE EDUCATION/TRAINING PROGRAM | Admitting: STUDENT IN AN ORGANIZED HEALTH CARE EDUCATION/TRAINING PROGRAM
Payer: COMMERCIAL

## 2022-12-31 VITALS
TEMPERATURE: 97 F | HEART RATE: 72 BPM | SYSTOLIC BLOOD PRESSURE: 121 MMHG | RESPIRATION RATE: 20 BRPM | DIASTOLIC BLOOD PRESSURE: 74 MMHG | WEIGHT: 199.08 LBS | OXYGEN SATURATION: 99 %

## 2022-12-31 VITALS — DIASTOLIC BLOOD PRESSURE: 78 MMHG | SYSTOLIC BLOOD PRESSURE: 127 MMHG

## 2022-12-31 DIAGNOSIS — R11.0 NAUSEA: ICD-10-CM

## 2022-12-31 DIAGNOSIS — K21.9 GASTRO-ESOPHAGEAL REFLUX DISEASE WITHOUT ESOPHAGITIS: ICD-10-CM

## 2022-12-31 DIAGNOSIS — Z87.39 PERSONAL HISTORY OF OTHER DISEASES OF THE MUSCULOSKELETAL SYSTEM AND CONNECTIVE TISSUE: ICD-10-CM

## 2022-12-31 DIAGNOSIS — Z86.711 PERSONAL HISTORY OF PULMONARY EMBOLISM: ICD-10-CM

## 2022-12-31 DIAGNOSIS — Z91.018 ALLERGY TO OTHER FOODS: ICD-10-CM

## 2022-12-31 DIAGNOSIS — R10.13 EPIGASTRIC PAIN: ICD-10-CM

## 2022-12-31 DIAGNOSIS — R07.89 OTHER CHEST PAIN: ICD-10-CM

## 2022-12-31 DIAGNOSIS — E78.00 PURE HYPERCHOLESTEROLEMIA, UNSPECIFIED: ICD-10-CM

## 2022-12-31 DIAGNOSIS — Z98.890 OTHER SPECIFIED POSTPROCEDURAL STATES: Chronic | ICD-10-CM

## 2022-12-31 DIAGNOSIS — Z87.891 PERSONAL HISTORY OF NICOTINE DEPENDENCE: ICD-10-CM

## 2022-12-31 LAB
ALBUMIN SERPL ELPH-MCNC: 3.9 G/DL — SIGNIFICANT CHANGE UP (ref 3.5–5.2)
ALP SERPL-CCNC: 83 U/L — SIGNIFICANT CHANGE UP (ref 30–115)
ALT FLD-CCNC: 30 U/L — SIGNIFICANT CHANGE UP (ref 0–41)
ANION GAP SERPL CALC-SCNC: 11 MMOL/L — SIGNIFICANT CHANGE UP (ref 7–14)
AST SERPL-CCNC: 23 U/L — SIGNIFICANT CHANGE UP (ref 0–41)
BASOPHILS # BLD AUTO: 0.02 K/UL — SIGNIFICANT CHANGE UP (ref 0–0.2)
BASOPHILS NFR BLD AUTO: 0.4 % — SIGNIFICANT CHANGE UP (ref 0–1)
BILIRUB SERPL-MCNC: 0.3 MG/DL — SIGNIFICANT CHANGE UP (ref 0.2–1.2)
BUN SERPL-MCNC: 14 MG/DL — SIGNIFICANT CHANGE UP (ref 10–20)
CALCIUM SERPL-MCNC: 9.6 MG/DL — SIGNIFICANT CHANGE UP (ref 8.4–10.5)
CHLORIDE SERPL-SCNC: 102 MMOL/L — SIGNIFICANT CHANGE UP (ref 98–110)
CO2 SERPL-SCNC: 27 MMOL/L — SIGNIFICANT CHANGE UP (ref 17–32)
CREAT SERPL-MCNC: 0.8 MG/DL — SIGNIFICANT CHANGE UP (ref 0.7–1.5)
EGFR: 91 ML/MIN/1.73M2 — SIGNIFICANT CHANGE UP
EOSINOPHIL # BLD AUTO: 0.14 K/UL — SIGNIFICANT CHANGE UP (ref 0–0.7)
EOSINOPHIL NFR BLD AUTO: 2.8 % — SIGNIFICANT CHANGE UP (ref 0–8)
GLUCOSE SERPL-MCNC: 108 MG/DL — HIGH (ref 70–99)
HCT VFR BLD CALC: 40.5 % — SIGNIFICANT CHANGE UP (ref 37–47)
HGB BLD-MCNC: 13.9 G/DL — SIGNIFICANT CHANGE UP (ref 12–16)
IMM GRANULOCYTES NFR BLD AUTO: 0.4 % — HIGH (ref 0.1–0.3)
LIDOCAIN IGE QN: 23 U/L — SIGNIFICANT CHANGE UP (ref 7–60)
LYMPHOCYTES # BLD AUTO: 1.44 K/UL — SIGNIFICANT CHANGE UP (ref 1.2–3.4)
LYMPHOCYTES # BLD AUTO: 29.3 % — SIGNIFICANT CHANGE UP (ref 20.5–51.1)
MAGNESIUM SERPL-MCNC: 1.8 MG/DL — SIGNIFICANT CHANGE UP (ref 1.8–2.4)
MCHC RBC-ENTMCNC: 30.5 PG — SIGNIFICANT CHANGE UP (ref 27–31)
MCHC RBC-ENTMCNC: 34.3 G/DL — SIGNIFICANT CHANGE UP (ref 32–37)
MCV RBC AUTO: 88.8 FL — SIGNIFICANT CHANGE UP (ref 81–99)
MONOCYTES # BLD AUTO: 0.42 K/UL — SIGNIFICANT CHANGE UP (ref 0.1–0.6)
MONOCYTES NFR BLD AUTO: 8.5 % — SIGNIFICANT CHANGE UP (ref 1.7–9.3)
NEUTROPHILS # BLD AUTO: 2.88 K/UL — SIGNIFICANT CHANGE UP (ref 1.4–6.5)
NEUTROPHILS NFR BLD AUTO: 58.6 % — SIGNIFICANT CHANGE UP (ref 42.2–75.2)
NRBC # BLD: 0 /100 WBCS — SIGNIFICANT CHANGE UP (ref 0–0)
PLATELET # BLD AUTO: 215 K/UL — SIGNIFICANT CHANGE UP (ref 130–400)
POTASSIUM SERPL-MCNC: 3.9 MMOL/L — SIGNIFICANT CHANGE UP (ref 3.5–5)
POTASSIUM SERPL-SCNC: 3.9 MMOL/L — SIGNIFICANT CHANGE UP (ref 3.5–5)
PROT SERPL-MCNC: 6.9 G/DL — SIGNIFICANT CHANGE UP (ref 6–8)
RBC # BLD: 4.56 M/UL — SIGNIFICANT CHANGE UP (ref 4.2–5.4)
RBC # FLD: 11.9 % — SIGNIFICANT CHANGE UP (ref 11.5–14.5)
SODIUM SERPL-SCNC: 140 MMOL/L — SIGNIFICANT CHANGE UP (ref 135–146)
TROPONIN T SERPL-MCNC: <0.01 NG/ML — SIGNIFICANT CHANGE UP
WBC # BLD: 4.92 K/UL — SIGNIFICANT CHANGE UP (ref 4.8–10.8)
WBC # FLD AUTO: 4.92 K/UL — SIGNIFICANT CHANGE UP (ref 4.8–10.8)

## 2022-12-31 PROCEDURE — 93010 ELECTROCARDIOGRAM REPORT: CPT

## 2022-12-31 PROCEDURE — 71046 X-RAY EXAM CHEST 2 VIEWS: CPT | Mod: 26

## 2022-12-31 PROCEDURE — 99285 EMERGENCY DEPT VISIT HI MDM: CPT

## 2022-12-31 RX ORDER — ONDANSETRON 8 MG/1
1 TABLET, FILM COATED ORAL
Qty: 15 | Refills: 0
Start: 2022-12-31 | End: 2023-01-04

## 2022-12-31 RX ORDER — FAMOTIDINE 10 MG/ML
20 INJECTION INTRAVENOUS ONCE
Refills: 0 | Status: COMPLETED | OUTPATIENT
Start: 2022-12-31 | End: 2022-12-31

## 2022-12-31 RX ORDER — ONDANSETRON 8 MG/1
4 TABLET, FILM COATED ORAL ONCE
Refills: 0 | Status: COMPLETED | OUTPATIENT
Start: 2022-12-31 | End: 2022-12-31

## 2022-12-31 RX ORDER — ONDANSETRON 8 MG/1
1 TABLET, FILM COATED ORAL
Qty: 15 | Refills: 0
Start: 2022-12-31 | End: 2023-01-05

## 2022-12-31 RX ADMIN — ONDANSETRON 4 MILLIGRAM(S): 8 TABLET, FILM COATED ORAL at 16:14

## 2022-12-31 RX ADMIN — FAMOTIDINE 100 MILLIGRAM(S): 10 INJECTION INTRAVENOUS at 16:13

## 2022-12-31 NOTE — ED PROVIDER NOTE - PHYSICAL EXAMINATION
Vital Signs: I have reviewed the initial vital signs.  Constitutional: well-nourished, no acute distress, normocephalic  Eyes: PERRLA, EOMI,  clear conjunctiva  ENT: MMM,   Cardiovascular: regular rate, regular rhythm, no murmur appreciated  Respiratory: unlabored respiratory effort, clear to auscultation bilaterally  Gastrointestinal: soft, epigastric tenderness, non-distended  abdomen, no pulsatile mass  Musculoskeletal: supple neck, no calf tenderness, no bony tenderness  Integumentary: warm, dry, no rash  Neurologic: awake, alert, cranial nerves II-XII grossly intact, extremities’ motor and sensory functions grossly intact, no focal deficits, GCS 15

## 2022-12-31 NOTE — ED PROVIDER NOTE - PATIENT PORTAL LINK FT
You can access the FollowMyHealth Patient Portal offered by [facility] by registering at the following website: [email or web address]. By joining [employer]’s FollowMyHealth portal, you will also be able to view your health information using other applications (apps) compatible with our system.

## 2022-12-31 NOTE — ED PROVIDER NOTE - CLINICAL SUMMARY MEDICAL DECISION MAKING FREE TEXT BOX
47-year-old female presenting today with nausea and epigastric pain x1 week.  Patient is hemodynamically stable upon evaluation.  Patient is nontoxic in appearance.  Patient's labs show no leukocytosis, no significant electrolyte abnormalities.  Troponin is negative.  Chest x-ray is unremarkable.  Patient given medications for suspected GERD other symptomatology appears to be going from her epigastric region to her chest and presents in a burning way and is aggravated by food.  Patient at this time be discharged to close follow-up with her PMD and gastroenterology.  Return precautions explained to patient.

## 2022-12-31 NOTE — ED ADULT NURSE NOTE - NSIMPLEMENTINTERV_GEN_ALL_ED
Implemented All Universal Safety Interventions:  [place] to call system. Call bell, personal items and telephone within reach. Instruct patient to call for assistance. Room bathroom lighting operational. Non-slip footwear when patient is off stretcher. Physically safe environment: no spills, clutter or unnecessary equipment. Stretcher in lowest position, wheels locked, appropriate side rails in place.

## 2022-12-31 NOTE — ED PROVIDER NOTE - NSFOLLOWUPINSTRUCTIONS_ED_ALL_ED_FT
Our Emergency Department Referral Coordinators will be reaching out ot you in the next 24-48 hours from 9:00am to 5:00pm (Monday to Friday) with a follow up appointment. Please expect a phone call from the hospital in that time frame. If you do not receive a call or if you have any questions or concerns, you can reach them at [phone] or [phone].        Abdominal Pain    Many things can cause abdominal pain. Usually, abdominal pain is not caused by a disease and will improve without treatment. Your health care provider will do a physical exam and possibly order blood tests and imaging to help determine the seriousness of your pain. However, in many cases, no cause may be found and you may need further testing as an outpatient. Monitor your abdominal pain for any changes.     SEEK IMMEDIATE MEDICAL CARE IF YOU HAVE THE FOLLOWING SYMPTOMS: worsening abdominal pain, vomiting, diarrhea, inability to have bowel movements or pass gas, black or bloody stool, fever accompanying chest pain or back pain, or dizziness/lightheadedness.

## 2023-01-01 RX ORDER — ONDANSETRON 8 MG/1
1 TABLET, FILM COATED ORAL
Qty: 15 | Refills: 0
Start: 2023-01-01 | End: 2023-01-05

## 2023-04-03 NOTE — H&P PST ADULT - BP NONINVASIVE MEAN (MM HG)
73 Ivermectin Counseling:  Patient instructed to take medication on an empty stomach with a full glass of water.  Patient informed of potential adverse effects including but not limited to nausea, diarrhea, dizziness, itching, and swelling of the extremities or lymph nodes.  The patient verbalized understanding of the proper use and possible adverse effects of ivermectin.  All of the patient's questions and concerns were addressed.

## 2023-07-21 ENCOUNTER — EMERGENCY (EMERGENCY)
Facility: HOSPITAL | Age: 48
LOS: 0 days | Discharge: HOME | End: 2023-07-21
Attending: EMERGENCY MEDICINE
Payer: COMMERCIAL

## 2023-07-21 VITALS
DIASTOLIC BLOOD PRESSURE: 78 MMHG | TEMPERATURE: 99 F | HEIGHT: 65 IN | HEART RATE: 71 BPM | RESPIRATION RATE: 18 BRPM | WEIGHT: 203.93 LBS | OXYGEN SATURATION: 99 % | SYSTOLIC BLOOD PRESSURE: 120 MMHG

## 2023-07-21 DIAGNOSIS — Z86.16 PERSONAL HISTORY OF COVID-19: ICD-10-CM

## 2023-07-21 DIAGNOSIS — E78.00 PURE HYPERCHOLESTEROLEMIA, UNSPECIFIED: ICD-10-CM

## 2023-07-21 DIAGNOSIS — Z20.822 CONTACT WITH AND (SUSPECTED) EXPOSURE TO COVID-19: ICD-10-CM

## 2023-07-21 DIAGNOSIS — Z87.39 PERSONAL HISTORY OF OTHER DISEASES OF THE MUSCULOSKELETAL SYSTEM AND CONNECTIVE TISSUE: ICD-10-CM

## 2023-07-21 DIAGNOSIS — Z86.711 PERSONAL HISTORY OF PULMONARY EMBOLISM: ICD-10-CM

## 2023-07-21 DIAGNOSIS — R05.1 ACUTE COUGH: ICD-10-CM

## 2023-07-21 DIAGNOSIS — R07.89 OTHER CHEST PAIN: ICD-10-CM

## 2023-07-21 DIAGNOSIS — Z98.890 OTHER SPECIFIED POSTPROCEDURAL STATES: Chronic | ICD-10-CM

## 2023-07-21 DIAGNOSIS — R09.81 NASAL CONGESTION: ICD-10-CM

## 2023-07-21 DIAGNOSIS — Z91.018 ALLERGY TO OTHER FOODS: ICD-10-CM

## 2023-07-21 DIAGNOSIS — M79.10 MYALGIA, UNSPECIFIED SITE: ICD-10-CM

## 2023-07-21 LAB
FLUAV AG NPH QL: SIGNIFICANT CHANGE UP
FLUBV AG NPH QL: SIGNIFICANT CHANGE UP
RSV RNA NPH QL NAA+NON-PROBE: SIGNIFICANT CHANGE UP
SARS-COV-2 RNA SPEC QL NAA+PROBE: SIGNIFICANT CHANGE UP

## 2023-07-21 PROCEDURE — 0241U: CPT

## 2023-07-21 PROCEDURE — 94640 AIRWAY INHALATION TREATMENT: CPT

## 2023-07-21 PROCEDURE — 93010 ELECTROCARDIOGRAM REPORT: CPT

## 2023-07-21 PROCEDURE — 99285 EMERGENCY DEPT VISIT HI MDM: CPT | Mod: 25

## 2023-07-21 PROCEDURE — 93005 ELECTROCARDIOGRAM TRACING: CPT

## 2023-07-21 PROCEDURE — 99284 EMERGENCY DEPT VISIT MOD MDM: CPT

## 2023-07-21 PROCEDURE — 71046 X-RAY EXAM CHEST 2 VIEWS: CPT

## 2023-07-21 PROCEDURE — 71046 X-RAY EXAM CHEST 2 VIEWS: CPT | Mod: 26

## 2023-07-21 RX ORDER — DEXAMETHASONE 0.5 MG/5ML
10 ELIXIR ORAL ONCE
Refills: 0 | Status: COMPLETED | OUTPATIENT
Start: 2023-07-21 | End: 2023-07-21

## 2023-07-21 RX ORDER — GABAPENTIN 400 MG/1
1 CAPSULE ORAL
Qty: 0 | Refills: 0 | DISCHARGE

## 2023-07-21 RX ORDER — IPRATROPIUM/ALBUTEROL SULFATE 18-103MCG
3 AEROSOL WITH ADAPTER (GRAM) INHALATION
Refills: 0 | Status: COMPLETED | OUTPATIENT
Start: 2023-07-21 | End: 2023-07-21

## 2023-07-21 RX ORDER — CETIRIZINE HYDROCHLORIDE 10 MG/1
1 TABLET ORAL
Qty: 0 | Refills: 0 | DISCHARGE

## 2023-07-21 RX ORDER — ALBUTEROL 90 UG/1
2 AEROSOL, METERED ORAL
Qty: 1 | Refills: 0
Start: 2023-07-21

## 2023-07-21 RX ADMIN — Medication 10 MILLIGRAM(S): at 11:25

## 2023-07-21 RX ADMIN — Medication 3 MILLILITER(S): at 11:59

## 2023-07-21 RX ADMIN — Medication 3 MILLILITER(S): at 11:00

## 2023-07-21 RX ADMIN — Medication 3 MILLILITER(S): at 11:25

## 2023-07-21 NOTE — ED PROVIDER NOTE - PATIENT PORTAL LINK FT
You can access the FollowMyHealth Patient Portal offered by [facility] by registering at the following website: [email or web address]. By joining [employer]’s FollowMyHealth portal, you will also be able to view your health information using other applications (apps) compatible with our system.

## 2023-07-21 NOTE — ED PROVIDER NOTE - ATTENDING APP SHARED VISIT CONTRIBUTION OF CARE
Patient complains of cough with systemic URI symptoms for approximately 1 week.  Patient has chest tightness on coughing only.  She has no change in exercise tolerance.  She denies measured fever.  Vital signs noted.  No apparent distress.  Chest is clear.  Heart regular rate no murmur.  Abdomen nontender [place]rett extremity equal pulses.  Chest x-ray reviewed.  I see no infiltrate.  A trial of steroids plus albuterol is ordered.  Patient is COVID-negative.  EG shows no ischemia.  Based on this EKG and the description of the patient's chest pain,  I doubt ACS.

## 2023-07-21 NOTE — ED PROVIDER NOTE - OBJECTIVE STATEMENT
48 year old female with pmhx of covid, PE, presents to ed with cough, congestion, and body aches x 1 week. Mild chest tightness when coughing. Low grade fever of 99 today. No pain, difficulty breathing, calf pain or swelling, abd pain or nausea, vomiting, diarrhea.

## 2023-07-21 NOTE — ED PROVIDER NOTE - PRIOR OUTPATIENT RADIOLOGY SUMMARY
Chest x-ray images dated December 2021 reviewed.  Today's chest x-ray shows no change from those previous images.

## 2023-07-21 NOTE — ED ADULT NURSE NOTE - HIV OFFER
eMERGENCY dEPARTMENT eNCOUnter      [address]    Chief Complaint   Patient presents with   â¢ Follow Up Cellulitis   â¢ Derm Problem       HPI    Fredi Chung is a 77year old female who presents to the emergency department for evaluation. Patient presents with redness and swelling to her left lower leg. She has a history of recurrent cellulitis to the leg. She did follow up with her primary care physician over the weekend. She was started on oral antibiotics. Feels as if the symptoms got worse. Redness seems to be spreading. She has had drainage from the leg. No shortness of breath. Uncertain of any fevers. She also complains of some chronic decubitus ulcers to her buttock area that were draining today as well. Given the worsening of symptoms he presents to the ER for evaluation    ALLERGIES    ALLERGIES:  No Known Allergies    CURRENT MEDICATIONS    Current Facility-Administered Medications   Medication Dose Route Frequency Provider Last Rate Last Admin   â¢ vancomycin (VANCOCIN) 1,000 mg in sodium chloride 0.9 % 250 mL IVPB  1,000 mg Intravenous Once Alfonza Aase Laskiewicz,  166.7 mL/hr at 11/01/21 1551 1,000 mg at 11/01/21 1551     Current Outpatient Medications   Medication Sig Dispense Refill   â¢ sulfamethoxazole-trimethoprim (BACTRIM DS) 800-160 MG per tablet Take 1 tablet by mouth 2 times daily. â¢ gabapentin (NEURONTIN) 600 MG tablet Take 600 mg by mouth 3 times daily. â¢ furosemide (LASIX) 20 MG tablet Take 20 mg by mouth every other day. â¢ doxycycline hyclate (VIBRA-TABS) 100 MG tablet Take 100 mg by mouth 2 times daily. â¢ betamethasone dipropionate augmented (DIPROLENE AF) 0.05 % cream APPLY TOPICALLY TO THE AFFECTED AREA TWICE DAILY     â¢ amoxicillin-clavulanate (AUGMENTIN) 875-125 MG per tablet Take 1 tablet by mouth 2 times daily. For 10 days     â¢ gabapentin (NEURONTIN) 800 MG tablet Take 0.5 tablets by mouth 2 times daily.  30 tablet 0   â¢ losartan (COZAAR) 100 MG tablet Take 1 tablet by mouth daily. Do not start before February 19, 2021. Hold if systolic blood pressure is <130 mmHg 30 tablet 0   â¢ lidocaine (LIDOCARE) 4 % patch Place 1 patch onto the skin daily. 10 patch 0   â¢ nystatin (MYCOSTATIN) 229975 UNIT/GM powder Apply topically 3 times daily. 30 g 0   â¢ traMADol (ULTRAM) 50 MG tablet Take 1 tablet by mouth every 8 hours as needed for Pain. 15 tablet 0   â¢ amLODIPine (NORVASC) 5 MG tablet Take 5 mg by mouth daily. â¢ cholecalciferol (VITAMIN D) 25 mcg (1,000 units) tablet Take 1,000 Units by mouth daily. â¢ acetaminophen (TYLENOL) 325 MG tablet Take 650 mg by mouth every 6 hours as needed for Pain (mild). â¢ oxybutynin (DITROPAN) 5 MG tablet Take 10 mg by mouth 2 times daily. â¢ aspirin 81 MG tablet Take 81 mg by mouth daily. â¢ carvedilol (COREG) 12.5 MG tablet Take 12.5 mg by mouth 2 times daily (with meals). â¢ apixaBAN (ELIQUIS) 5 MG Tab Take 5 mg by mouth 2 times daily.          PAST MEDICAL HISTORY    Past Medical History:   Diagnosis Date   â¢ Acute cholecystitis    â¢ CAD (coronary artery disease)    â¢ Chronic pain    â¢ Diverticulitis    â¢ Essential (primary) hypertension    â¢ GERD (gastroesophageal reflux disease)    â¢ Myocardial infarct, old    â¢ RA (rheumatoid arthritis) (CMS/[facility])    â¢ Spinal stenosis    â¢ TIA (transient ischemic attack)        SURGICAL HISTORY    Past Surgical History:   Procedure Laterality Date   â¢ Carpal tunnel release     â¢ Cholecystectomy  04/2019   â¢ Orif hip fracture         SOCIAL HISTORY    Social History     Tobacco Use   â¢ Smoking status: Former Smoker     Packs/day: 0.50     Years: 30.00     Pack years: 15.00     Types: Cigarettes     Quit date: 2/10/2018     Years since quitting: 3.7   â¢ Smokeless tobacco: Never Used   Vaping Use   â¢ Vaping Use: Never assessed   Substance Use Topics   â¢ Alcohol use: Not Currently   â¢ Drug use: Never       FAMILY HISTORY    Family History   Problem Relation Age of Onset   â¢ Heart disease Mother    â¢ Heart disease Father        REVIEW OF SYSTEMS    Constitutional:  Denies fever or chills. Denies generalized weakness  Eyes:  Denies change in visual acuity. Denies drainage. Denies eye redness. Denies eye pain  HENT:  Denies nasal congestion or sore throat. Denies earache. Denies postnasal drip. Denies difficulty swallowing  Respiratory:  Denies cough or shortness of breath. Denies hemoptysis  Cardiovascular:  Denies chest pain or edema. Denies palpitations. Denies syncope Denies orthopnea. GI:  Denies abdominal pain, nausea, vomiting, bloody stools or diarrhea. Denies hematemesis  :  Denies dysuria. Denies urinary frequency. Denies hematuria. Musculoskeletal:  Denies back pain or joint pain. Integument:  Positive redness to the left lower extremity. Positive decubitus ulcers Denies laceration or abrasion  Neurologic:  Denies headache, focal weakness. Denies numbness or tingling in any extremity. Denies weakness in any extremity. Denies loss of bladder or bowel control  Endocrine:  Denies polyuria or polydipsia. Lymphatic:  Denies swollen glands. Psychiatric:  Denies depression or anxiety. PHYSICAL EXAM    ED Triage Vitals   BP 11/01/21 1300 (!) 146/66   Heart Rate 11/01/21 1300 91   Resp 11/01/21 1300 18   Temp 11/01/21 1300 99 Â°F (37.2 Â°C)   SpO2 11/01/21 1258 94 %     Pulse Ox Interpretation:  Within normal limits  Constitutional:  Well developed, well nourished. No acute distress, non-toxic appearance. Patient is able to speak in full sentences  Eyes:  PERRL, EOMI. Conjunctivae normal. No subconjunctival hemorrhage. No papilledema. No drainage  HENT:  Atraumatic. External ears normal. Nose normal. Oropharynx moist. Pharynx is nonerythematous. Uvula is midline. Tympanic membranes are pearly gray bilaterally. No TM perforation. No evidence of peritonsillar abscess  Neck: Normal range of motion. No point c-spine tenderness. Supple. No JVD. No carotid bruits.  Negative Kernig and Brudzinski signs  Respiratory:  No respiratory distress, normal breath sounds. No rales. No wheezing. Cardiovascular:  Normal rate, normal rhythm. No murmurs, gallops, or rubs. No thrills  GI:  Soft, nondistended. Normal bowel sounds, nontender. No hepatomegaly, splenomegaly, mass, rebound or guarding. Negative McBurney's point tenderness to palpation. Negative psoas sign. Negative heel jar. No fluid wave on palpation. No pulsatile mass on exam  :  No costovertebral angle tenderness. Musculoskeletal:  No edema, tenderness, or deformities to any of the 4 extremities. Back - no thoracic or lumbar point tenderness. Integument:  Well hydrated, positive erythema to the left lower leg area with serosanguineous drainage. No petechia or purpura. No abrasions or lacerations  Lymphatic:  No anterior or posterior cervical lymphadenopathy noted. Neurologic:  Alert & oriented x 3. Normal Mental status. Normal Cranial Nervies. EOMI. YONY. Normal 5/5 muscular strength in both upper and lower extremities. Normal sensation. Normal and symmetric reflexes. Normal cerbellar function. Normal Gait. Negative Babinski. Straight leg raise is negative bilaterally. Negative saddle anesthesia. GCS 15  Psychiatric:  Speech and behavior appropriate. RADIOLOGY    US Lower Extremity Venous Duplex Left   Final Result   IMPRESSION: No evidence for acute DVT involving the deep venous system and   calf veins of the left lower extremity. LABS    Results for orders placed or performed during the hospital encounter of 11/01/21   Comprehensive Metabolic Panel   Result Value    Fasting Status     Sodium 137    Potassium 4.8    Chloride 102    Carbon Dioxide 28    Anion Gap 12    Glucose 106 (H)    BUN 52 (H)    Creatinine 1.33 (H)    Glomerular Filtration Rate 38 (L)     Comment: eGFR 30-59 mL/min/1.73m2 = Moderate decrease in kidney function. Stage 3 CKD (chronic kidney disease) or moderate kidney disease.  Estimated GFR calculated using the 2009 CKD-EPI creatinine equation. BUN/ Creatinine Ratio 39 (H)    Calcium 9.3    Bilirubin, Total 0.5    GOT/AST 34    GPT/ALT 46    Alkaline Phosphatase 127 (H)    Albumin 3.2 (L)    Protein, Total 7.7    Globulin 4.5 (H)    A/G Ratio 0.7 (L)   Procalcitonin   Result Value    Procalcitonin 0.07     Comment:   Bacterial Sepsis:  <0.5 ng/mL:    Sepsis not likely. Localized bacterial infection possible. 0.5 - 2 ng/mL: Sepsis or other conditions possible  >2.0 ng/mL:    High risk of sepsis/septic shock    NOTE: If bacterial sepsis is of high clinical suspicion but PCT<2 ng/mL,  consider recheck PCT 6-24 hours after initial test.     Lower Respiratory Tract Infection (LRTI):  <0.1 ng/mL:       Bacterial infection highly unlikely  0.1 - 0.25 ng/mL: Bacterial infection unlikely  0.26 - 0.5 ng/mL: Bacterial infection likely  > 0.5 ng/mL:      Bacterial infection highly likely    NOTE: Patients with advanced CKD or medical/surgical trauma may have  elevated baseline PCT (>0.5 ng/mL) in the absence of bacterial infection. If  bacterial infection is suspected, consider repeat PCT in 2 to 4 days to guide de-escalation or discontinuation of antibiotic therapy. Higher reference range cutoffs may be appropriate for patients <1days old. CBC with Automated Differential (performable only)   Result Value    WBC 13.4 (H)    RBC 4.74    HGB 12.3    HCT 40.8    MCV 86.1    MCH 25.9 (L)    MCHC 30.1 (L)    RDW-CV 16.3 (H)    RDW-SD 50.8 (H)        NRBC 0    Neutrophil, Percent 86    Lymphocytes, Percent 5    Mono, Percent 5    Eosinophils, Percent 4    Basophils, Percent 0    Immature Granulocytes 0    Absolute Neutrophils 11.5 (H)    Absolute Lymphocytes 0.6 (L)    Absolute Monocytes 0.6    Absolute Eosinophils  0.6 (H)    Absolute Basophils 0.0    Absolute Immmature Granulocytes 0.1   Rapid SARS-CoV-2 by PCR    Specimen: Nasal, Mid-turbinate;  Swab   Result Value    Rapid SARS-COV-2 by PCR Not Detected    Isolation Guidelines      Comment: Do not use this test result as the sole decision-maker for discontinuation of isolation. Clinical evaluation should be considered for other respiratory illness requiring transmission-based isolation.    -    No fever (<99.0 F/37.2 C) for at least 24 hours without the use of fever-reducing medications    AND  -    Respiratory symptoms have improved or resolved (e.g. cough, shortness of breath)     AND  -    COVID-19 negative test    See COVID-19 Deisolation Resource Guide    Procedural Comment      Comment: SARS-CoV-2 nucleic acid has not been detected indicating the absence of COVID-19. Testing was performed using the "[employer] Xpert Xpress SARS-CoV-2 RT-PCR assay that has been given Emergency Use Authorization (EUA) by the [employer] and Drug Administration (FDA). These results are considered definitive and do not need to be confirmed by another method. LACTIC ACID VENOUS POINT OF CARE   Result Value    LACTIC ACID, VENOUS - POINT OF CARE 1.2         ED COURSE & MEDICAL DECISION MAKING    1:20 Pm- to this patient's presentation upon arrival she will have an IV established. Labs will be drawn and sent including blood cultures and a lactic acid. Doppler will be obtained of the left leg. She is nontoxic in appearance. I was able to review her history extensively. 2:00 PM- patient's white count is mildly elevated at 13.4. Lactic acid is normal    2:35 Pm- Doppler reveals no evidence for DVT. Given worsening of symptoms and failure of outpatient treatment she will require admission with IV antibiotics    3:10 Pm- I did speak with Dr. Herminia Rankin our hospitalist.  Discussed the patient's presentation and findings. He accepts patient for admission. Rocephin and vancomycin have been ordered.     FINAL IMPRESSION    Left lower leg cellulitis         Pamela Escobar DO  11/01/21 4820 Opt out

## 2023-07-21 NOTE — ED ADULT NURSE NOTE - NSFALLUNIVINTERV_ED_ALL_ED
Bed/Stretcher in lowest position, wheels locked, appropriate side rails in place/Call bell, personal items and telephone in reach/Instruct patient to call for assistance before getting out of bed/chair/stretcher/Non-slip footwear applied when patient is off stretcher/[place] to call system/Physically safe environment - no spills, clutter or unnecessary equipment/Purposeful proactive rounding/Room/bathroom lighting operational, light cord in reach

## 2023-08-04 NOTE — ED ADULT NURSE NOTE - NS ED NOTE ABUSE SUSPICION NEGLECT YN
No

## 2023-11-16 ENCOUNTER — APPOINTMENT (OUTPATIENT)
Dept: PAIN MANAGEMENT | Facility: CLINIC | Age: 48
End: 2023-11-16

## 2023-11-17 NOTE — PATIENT PROFILE ADULT - FOOD INSECURITY
Anesthesia Evaluation     Patient summary reviewed and Nursing notes reviewed   no history of anesthetic complications:   NPO Solid Status: > 8 hours  NPO Liquid Status: > 8 hours           Airway   Mallampati: II  TM distance: >3 FB  Dental          Pulmonary - negative pulmonary ROS   (-) asthma, sleep apnea, not a smoker  Cardiovascular   Exercise tolerance: excellent (>7 METS)    (+) hypertension  (-) past MI, CAD, dysrhythmias, cardiac stents, hyperlipidemia      Neuro/Psych- negative ROS  (-) seizures, TIA, CVA  GI/Hepatic/Renal/Endo    (+) liver disease (cyst), diabetes mellitus  (-) no renal disease    Musculoskeletal     Abdominal    Substance History      OB/GYN          Other      history of cancer (breast) remission                    Anesthesia Plan    ASA 2     MAC     intravenous induction     Anesthetic plan, risks, benefits, and alternatives have been provided, discussed and informed consent has been obtained with: patient.      CODE STATUS:         
no

## 2023-12-27 ENCOUNTER — APPOINTMENT (OUTPATIENT)
Dept: PAIN MANAGEMENT | Facility: CLINIC | Age: 48
End: 2023-12-27

## 2024-01-13 ENCOUNTER — EMERGENCY (EMERGENCY)
Facility: HOSPITAL | Age: 49
LOS: 0 days | Discharge: HOME | End: 2024-01-13
Attending: EMERGENCY MEDICINE
Payer: MEDICAID

## 2024-01-13 VITALS
DIASTOLIC BLOOD PRESSURE: 78 MMHG | HEART RATE: 74 BPM | RESPIRATION RATE: 20 BRPM | WEIGHT: 207.01 LBS | SYSTOLIC BLOOD PRESSURE: 133 MMHG | OXYGEN SATURATION: 99 % | TEMPERATURE: 98 F

## 2024-01-13 DIAGNOSIS — Z87.891 PERSONAL HISTORY OF NICOTINE DEPENDENCE: ICD-10-CM

## 2024-01-13 DIAGNOSIS — Z20.822 CONTACT WITH AND (SUSPECTED) EXPOSURE TO COVID-19: ICD-10-CM

## 2024-01-13 DIAGNOSIS — R07.9 CHEST PAIN, UNSPECIFIED: ICD-10-CM

## 2024-01-13 DIAGNOSIS — R05.1 ACUTE COUGH: ICD-10-CM

## 2024-01-13 DIAGNOSIS — Z86.711 PERSONAL HISTORY OF PULMONARY EMBOLISM: ICD-10-CM

## 2024-01-13 DIAGNOSIS — Z98.890 OTHER SPECIFIED POSTPROCEDURAL STATES: Chronic | ICD-10-CM

## 2024-01-13 DIAGNOSIS — R06.02 SHORTNESS OF BREATH: ICD-10-CM

## 2024-01-13 DIAGNOSIS — Z91.018 ALLERGY TO OTHER FOODS: ICD-10-CM

## 2024-01-13 LAB
ALBUMIN SERPL ELPH-MCNC: 4.2 G/DL — SIGNIFICANT CHANGE UP (ref 3.5–5.2)
ALBUMIN SERPL ELPH-MCNC: 4.2 G/DL — SIGNIFICANT CHANGE UP (ref 3.5–5.2)
ALP SERPL-CCNC: 114 U/L — SIGNIFICANT CHANGE UP (ref 30–115)
ALP SERPL-CCNC: 114 U/L — SIGNIFICANT CHANGE UP (ref 30–115)
ALT FLD-CCNC: 33 U/L — SIGNIFICANT CHANGE UP (ref 0–41)
ALT FLD-CCNC: 33 U/L — SIGNIFICANT CHANGE UP (ref 0–41)
ANION GAP SERPL CALC-SCNC: 8 MMOL/L — SIGNIFICANT CHANGE UP (ref 7–14)
ANION GAP SERPL CALC-SCNC: 8 MMOL/L — SIGNIFICANT CHANGE UP (ref 7–14)
APTT BLD: 33.8 SEC — SIGNIFICANT CHANGE UP (ref 27–39.2)
APTT BLD: 33.8 SEC — SIGNIFICANT CHANGE UP (ref 27–39.2)
AST SERPL-CCNC: 25 U/L — SIGNIFICANT CHANGE UP (ref 0–41)
AST SERPL-CCNC: 25 U/L — SIGNIFICANT CHANGE UP (ref 0–41)
BASE EXCESS BLDV CALC-SCNC: 3.7 MMOL/L — HIGH (ref -2–3)
BASE EXCESS BLDV CALC-SCNC: 3.7 MMOL/L — HIGH (ref -2–3)
BASOPHILS # BLD AUTO: 0.04 K/UL — SIGNIFICANT CHANGE UP (ref 0–0.2)
BASOPHILS # BLD AUTO: 0.04 K/UL — SIGNIFICANT CHANGE UP (ref 0–0.2)
BASOPHILS NFR BLD AUTO: 0.7 % — SIGNIFICANT CHANGE UP (ref 0–1)
BASOPHILS NFR BLD AUTO: 0.7 % — SIGNIFICANT CHANGE UP (ref 0–1)
BILIRUB SERPL-MCNC: 0.3 MG/DL — SIGNIFICANT CHANGE UP (ref 0.2–1.2)
BILIRUB SERPL-MCNC: 0.3 MG/DL — SIGNIFICANT CHANGE UP (ref 0.2–1.2)
BUN SERPL-MCNC: 15 MG/DL — SIGNIFICANT CHANGE UP (ref 10–20)
BUN SERPL-MCNC: 15 MG/DL — SIGNIFICANT CHANGE UP (ref 10–20)
CA-I SERPL-SCNC: 1.23 MMOL/L — SIGNIFICANT CHANGE UP (ref 1.15–1.33)
CA-I SERPL-SCNC: 1.23 MMOL/L — SIGNIFICANT CHANGE UP (ref 1.15–1.33)
CALCIUM SERPL-MCNC: 9.7 MG/DL — SIGNIFICANT CHANGE UP (ref 8.4–10.5)
CALCIUM SERPL-MCNC: 9.7 MG/DL — SIGNIFICANT CHANGE UP (ref 8.4–10.5)
CHLORIDE SERPL-SCNC: 104 MMOL/L — SIGNIFICANT CHANGE UP (ref 98–110)
CHLORIDE SERPL-SCNC: 104 MMOL/L — SIGNIFICANT CHANGE UP (ref 98–110)
CO2 SERPL-SCNC: 27 MMOL/L — SIGNIFICANT CHANGE UP (ref 17–32)
CO2 SERPL-SCNC: 27 MMOL/L — SIGNIFICANT CHANGE UP (ref 17–32)
CREAT SERPL-MCNC: 0.7 MG/DL — SIGNIFICANT CHANGE UP (ref 0.7–1.5)
CREAT SERPL-MCNC: 0.7 MG/DL — SIGNIFICANT CHANGE UP (ref 0.7–1.5)
EGFR: 106 ML/MIN/1.73M2 — SIGNIFICANT CHANGE UP
EGFR: 106 ML/MIN/1.73M2 — SIGNIFICANT CHANGE UP
EOSINOPHIL # BLD AUTO: 0.19 K/UL — SIGNIFICANT CHANGE UP (ref 0–0.7)
EOSINOPHIL # BLD AUTO: 0.19 K/UL — SIGNIFICANT CHANGE UP (ref 0–0.7)
EOSINOPHIL NFR BLD AUTO: 3.3 % — SIGNIFICANT CHANGE UP (ref 0–8)
EOSINOPHIL NFR BLD AUTO: 3.3 % — SIGNIFICANT CHANGE UP (ref 0–8)
FLUAV AG NPH QL: SIGNIFICANT CHANGE UP
FLUAV AG NPH QL: SIGNIFICANT CHANGE UP
FLUBV AG NPH QL: SIGNIFICANT CHANGE UP
FLUBV AG NPH QL: SIGNIFICANT CHANGE UP
GAS PNL BLDV: 138 MMOL/L — SIGNIFICANT CHANGE UP (ref 136–145)
GAS PNL BLDV: 138 MMOL/L — SIGNIFICANT CHANGE UP (ref 136–145)
GAS PNL BLDV: SIGNIFICANT CHANGE UP
GAS PNL BLDV: SIGNIFICANT CHANGE UP
GLUCOSE SERPL-MCNC: 94 MG/DL — SIGNIFICANT CHANGE UP (ref 70–99)
GLUCOSE SERPL-MCNC: 94 MG/DL — SIGNIFICANT CHANGE UP (ref 70–99)
HCG SERPL QL: NEGATIVE — SIGNIFICANT CHANGE UP
HCG SERPL QL: NEGATIVE — SIGNIFICANT CHANGE UP
HCO3 BLDV-SCNC: 30 MMOL/L — HIGH (ref 22–29)
HCO3 BLDV-SCNC: 30 MMOL/L — HIGH (ref 22–29)
HCT VFR BLD CALC: 41.1 % — SIGNIFICANT CHANGE UP (ref 37–47)
HCT VFR BLD CALC: 41.1 % — SIGNIFICANT CHANGE UP (ref 37–47)
HCT VFR BLDA CALC: 43 % — SIGNIFICANT CHANGE UP (ref 34.5–46.5)
HCT VFR BLDA CALC: 43 % — SIGNIFICANT CHANGE UP (ref 34.5–46.5)
HGB BLD CALC-MCNC: 14.3 G/DL — SIGNIFICANT CHANGE UP (ref 11.7–16.1)
HGB BLD CALC-MCNC: 14.3 G/DL — SIGNIFICANT CHANGE UP (ref 11.7–16.1)
HGB BLD-MCNC: 13.8 G/DL — SIGNIFICANT CHANGE UP (ref 12–16)
HGB BLD-MCNC: 13.8 G/DL — SIGNIFICANT CHANGE UP (ref 12–16)
IMM GRANULOCYTES NFR BLD AUTO: 0.4 % — HIGH (ref 0.1–0.3)
IMM GRANULOCYTES NFR BLD AUTO: 0.4 % — HIGH (ref 0.1–0.3)
INR BLD: 0.97 RATIO — SIGNIFICANT CHANGE UP (ref 0.65–1.3)
INR BLD: 0.97 RATIO — SIGNIFICANT CHANGE UP (ref 0.65–1.3)
LACTATE BLDV-MCNC: 1 MMOL/L — SIGNIFICANT CHANGE UP (ref 0.5–2)
LACTATE BLDV-MCNC: 1 MMOL/L — SIGNIFICANT CHANGE UP (ref 0.5–2)
LYMPHOCYTES # BLD AUTO: 1.88 K/UL — SIGNIFICANT CHANGE UP (ref 1.2–3.4)
LYMPHOCYTES # BLD AUTO: 1.88 K/UL — SIGNIFICANT CHANGE UP (ref 1.2–3.4)
LYMPHOCYTES # BLD AUTO: 33 % — SIGNIFICANT CHANGE UP (ref 20.5–51.1)
LYMPHOCYTES # BLD AUTO: 33 % — SIGNIFICANT CHANGE UP (ref 20.5–51.1)
MCHC RBC-ENTMCNC: 30.3 PG — SIGNIFICANT CHANGE UP (ref 27–31)
MCHC RBC-ENTMCNC: 30.3 PG — SIGNIFICANT CHANGE UP (ref 27–31)
MCHC RBC-ENTMCNC: 33.6 G/DL — SIGNIFICANT CHANGE UP (ref 32–37)
MCHC RBC-ENTMCNC: 33.6 G/DL — SIGNIFICANT CHANGE UP (ref 32–37)
MCV RBC AUTO: 90.1 FL — SIGNIFICANT CHANGE UP (ref 81–99)
MCV RBC AUTO: 90.1 FL — SIGNIFICANT CHANGE UP (ref 81–99)
MONOCYTES # BLD AUTO: 0.57 K/UL — SIGNIFICANT CHANGE UP (ref 0.1–0.6)
MONOCYTES # BLD AUTO: 0.57 K/UL — SIGNIFICANT CHANGE UP (ref 0.1–0.6)
MONOCYTES NFR BLD AUTO: 10 % — HIGH (ref 1.7–9.3)
MONOCYTES NFR BLD AUTO: 10 % — HIGH (ref 1.7–9.3)
NEUTROPHILS # BLD AUTO: 3 K/UL — SIGNIFICANT CHANGE UP (ref 1.4–6.5)
NEUTROPHILS # BLD AUTO: 3 K/UL — SIGNIFICANT CHANGE UP (ref 1.4–6.5)
NEUTROPHILS NFR BLD AUTO: 52.6 % — SIGNIFICANT CHANGE UP (ref 42.2–75.2)
NEUTROPHILS NFR BLD AUTO: 52.6 % — SIGNIFICANT CHANGE UP (ref 42.2–75.2)
NRBC # BLD: 0 /100 WBCS — SIGNIFICANT CHANGE UP (ref 0–0)
NRBC # BLD: 0 /100 WBCS — SIGNIFICANT CHANGE UP (ref 0–0)
NT-PROBNP SERPL-SCNC: 38 PG/ML — SIGNIFICANT CHANGE UP (ref 0–300)
NT-PROBNP SERPL-SCNC: 38 PG/ML — SIGNIFICANT CHANGE UP (ref 0–300)
PCO2 BLDV: 54 MMHG — HIGH (ref 39–42)
PCO2 BLDV: 54 MMHG — HIGH (ref 39–42)
PH BLDV: 7.36 — SIGNIFICANT CHANGE UP (ref 7.32–7.43)
PH BLDV: 7.36 — SIGNIFICANT CHANGE UP (ref 7.32–7.43)
PLATELET # BLD AUTO: 265 K/UL — SIGNIFICANT CHANGE UP (ref 130–400)
PLATELET # BLD AUTO: 265 K/UL — SIGNIFICANT CHANGE UP (ref 130–400)
PMV BLD: 9.9 FL — SIGNIFICANT CHANGE UP (ref 7.4–10.4)
PMV BLD: 9.9 FL — SIGNIFICANT CHANGE UP (ref 7.4–10.4)
PO2 BLDV: 22 MMHG — LOW (ref 25–45)
PO2 BLDV: 22 MMHG — LOW (ref 25–45)
POTASSIUM BLDV-SCNC: 4.4 MMOL/L — SIGNIFICANT CHANGE UP (ref 3.5–5.1)
POTASSIUM BLDV-SCNC: 4.4 MMOL/L — SIGNIFICANT CHANGE UP (ref 3.5–5.1)
POTASSIUM SERPL-MCNC: 4.5 MMOL/L — SIGNIFICANT CHANGE UP (ref 3.5–5)
POTASSIUM SERPL-MCNC: 4.5 MMOL/L — SIGNIFICANT CHANGE UP (ref 3.5–5)
POTASSIUM SERPL-SCNC: 4.5 MMOL/L — SIGNIFICANT CHANGE UP (ref 3.5–5)
POTASSIUM SERPL-SCNC: 4.5 MMOL/L — SIGNIFICANT CHANGE UP (ref 3.5–5)
PROT SERPL-MCNC: 7.1 G/DL — SIGNIFICANT CHANGE UP (ref 6–8)
PROT SERPL-MCNC: 7.1 G/DL — SIGNIFICANT CHANGE UP (ref 6–8)
PROTHROM AB SERPL-ACNC: 11.1 SEC — SIGNIFICANT CHANGE UP (ref 9.95–12.87)
PROTHROM AB SERPL-ACNC: 11.1 SEC — SIGNIFICANT CHANGE UP (ref 9.95–12.87)
RBC # BLD: 4.56 M/UL — SIGNIFICANT CHANGE UP (ref 4.2–5.4)
RBC # BLD: 4.56 M/UL — SIGNIFICANT CHANGE UP (ref 4.2–5.4)
RBC # FLD: 12 % — SIGNIFICANT CHANGE UP (ref 11.5–14.5)
RBC # FLD: 12 % — SIGNIFICANT CHANGE UP (ref 11.5–14.5)
RSV RNA NPH QL NAA+NON-PROBE: SIGNIFICANT CHANGE UP
RSV RNA NPH QL NAA+NON-PROBE: SIGNIFICANT CHANGE UP
SAO2 % BLDV: 26.5 % — LOW (ref 67–88)
SAO2 % BLDV: 26.5 % — LOW (ref 67–88)
SARS-COV-2 RNA SPEC QL NAA+PROBE: SIGNIFICANT CHANGE UP
SARS-COV-2 RNA SPEC QL NAA+PROBE: SIGNIFICANT CHANGE UP
SODIUM SERPL-SCNC: 139 MMOL/L — SIGNIFICANT CHANGE UP (ref 135–146)
SODIUM SERPL-SCNC: 139 MMOL/L — SIGNIFICANT CHANGE UP (ref 135–146)
TROPONIN T, HIGH SENSITIVITY RESULT: <6 NG/L — SIGNIFICANT CHANGE UP (ref 6–13)
TROPONIN T, HIGH SENSITIVITY RESULT: <6 NG/L — SIGNIFICANT CHANGE UP (ref 6–13)
WBC # BLD: 5.7 K/UL — SIGNIFICANT CHANGE UP (ref 4.8–10.8)
WBC # BLD: 5.7 K/UL — SIGNIFICANT CHANGE UP (ref 4.8–10.8)
WBC # FLD AUTO: 5.7 K/UL — SIGNIFICANT CHANGE UP (ref 4.8–10.8)
WBC # FLD AUTO: 5.7 K/UL — SIGNIFICANT CHANGE UP (ref 4.8–10.8)

## 2024-01-13 PROCEDURE — 93010 ELECTROCARDIOGRAM REPORT: CPT

## 2024-01-13 PROCEDURE — 71046 X-RAY EXAM CHEST 2 VIEWS: CPT

## 2024-01-13 PROCEDURE — 84703 CHORIONIC GONADOTROPIN ASSAY: CPT

## 2024-01-13 PROCEDURE — 85025 COMPLETE CBC W/AUTO DIFF WBC: CPT

## 2024-01-13 PROCEDURE — 99285 EMERGENCY DEPT VISIT HI MDM: CPT | Mod: 25

## 2024-01-13 PROCEDURE — 80053 COMPREHEN METABOLIC PANEL: CPT

## 2024-01-13 PROCEDURE — 85018 HEMOGLOBIN: CPT

## 2024-01-13 PROCEDURE — 99285 EMERGENCY DEPT VISIT HI MDM: CPT

## 2024-01-13 PROCEDURE — 36415 COLL VENOUS BLD VENIPUNCTURE: CPT

## 2024-01-13 PROCEDURE — 85730 THROMBOPLASTIN TIME PARTIAL: CPT

## 2024-01-13 PROCEDURE — 82803 BLOOD GASES ANY COMBINATION: CPT

## 2024-01-13 PROCEDURE — 83880 ASSAY OF NATRIURETIC PEPTIDE: CPT

## 2024-01-13 PROCEDURE — 84132 ASSAY OF SERUM POTASSIUM: CPT

## 2024-01-13 PROCEDURE — 71046 X-RAY EXAM CHEST 2 VIEWS: CPT | Mod: 26

## 2024-01-13 PROCEDURE — 71275 CT ANGIOGRAPHY CHEST: CPT | Mod: MA

## 2024-01-13 PROCEDURE — 93005 ELECTROCARDIOGRAM TRACING: CPT

## 2024-01-13 PROCEDURE — 82330 ASSAY OF CALCIUM: CPT

## 2024-01-13 PROCEDURE — 83605 ASSAY OF LACTIC ACID: CPT

## 2024-01-13 PROCEDURE — 84484 ASSAY OF TROPONIN QUANT: CPT

## 2024-01-13 PROCEDURE — 0241U: CPT

## 2024-01-13 PROCEDURE — 71275 CT ANGIOGRAPHY CHEST: CPT | Mod: 26,MA

## 2024-01-13 PROCEDURE — 85610 PROTHROMBIN TIME: CPT

## 2024-01-13 PROCEDURE — 84295 ASSAY OF SERUM SODIUM: CPT

## 2024-01-13 PROCEDURE — 85014 HEMATOCRIT: CPT

## 2024-01-13 RX ORDER — ALBUTEROL 90 UG/1
1 AEROSOL, METERED ORAL ONCE
Refills: 0 | Status: DISCONTINUED | OUTPATIENT
Start: 2024-01-13 | End: 2024-01-13

## 2024-01-13 RX ORDER — ALBUTEROL 90 UG/1
2.5 AEROSOL, METERED ORAL ONCE
Refills: 0 | Status: COMPLETED | OUTPATIENT
Start: 2024-01-13 | End: 2024-01-13

## 2024-01-13 NOTE — ED PROVIDER NOTE - ATTENDING APP SHARED VISIT CONTRIBUTION OF CARE
See MDM 48-year-old female history of prior provoked PE, no longer on anticoagulation, ?asthma, presenting for evaluation of chest pain with associated shortness of breath, dyspnea on exertion for the past week getting progressively worse.  No recent travel, trauma.  No other acute complaints.  Well appearing, NAD, non toxic. NCAT PERRLA EOMI neck supple non tender normal wob cta bl rrr abdomen s nt nd no rebound no guarding WWPx4 neuro non focal

## 2024-01-13 NOTE — ED PROVIDER NOTE - OBJECTIVE STATEMENT
patient c/o SOB, BARRIOS, cough, chest pain, over past mos, worsening past week, H/o PE in the past,

## 2024-01-13 NOTE — ED PROVIDER NOTE - PATIENT PORTAL LINK FT
You can access the FollowMyHealth Patient Portal offered by [facility] by registering at the following website: [email or web address]. By joining [facility]’s FollowMyHealth portal, you will also be able to view your health information using other applications (apps) compatible with our system. You can access the FollowMyHealth Patient Portal offered by [facility] by registering at the following website: [email or web address]. By joining [employer]’s FollowMyHealth portal, you will also be able to view your health information using other applications (apps) compatible with our system. You can access the FollowMyHealth Patient Portal offered by [facility] by registering at the following website: [email or web address]. By joining [employer]’s FollowMyHealth portal, you will also be able to view your health information using other applications (apps) compatible with our system.

## 2024-01-13 NOTE — ED PROVIDER NOTE - PROGRESS NOTE DETAILS
EKG imaging reviewed.  Patient states that she still feels short of breath.  Offered admission for further evaluation, patient declining, wants to follow-up outpatient with her cardiologist Dr. Liang.  Patient states that her shortness of breath also typically flares when air quality becomes poor.  Will give albuterol, steroids, reassess.

## 2024-01-13 NOTE — ED PROVIDER NOTE - NSFOLLOWUPINSTRUCTIONS_ED_ALL_ED_FT
take meds as prescribed, See your cardiologist and pulmonologist for follow up     Our Emergency Department Referral Coordinators will be reaching out to you in the next 24-48 hours from 9:00am to 5:00pm with a follow up appointment. Please expect a phone call from the hospital in that time frame. If you do not receive a call or if you have any questions or concerns, you can reach them at [phone] or [phone]. take meds as prescribed, See your cardiologist and pulmonologist for follow up     Our Emergency Department Referral Coordinators will be reaching out to you in the next 24-48 hours from 9:00am to 5:00pm with a follow up appointment. Please expect a phone call from the hospital in that time frame. If you do not receive a call or if you have any questions or concerns, you can reach them at [phone] or [phone]. take meds as prescribed, See your cardiologist and pulmonologist for follow up     Our Emergency Department Referral Coordinators will be reaching out to you in the next 24-48 hours from 9:00am to 5:00pm with a follow up appointment. Please expect a phone call from the hospital in that time frame. If you do not receive a call or if you have any questions or concerns, you can reach them at [phone] or [phone].

## 2024-01-13 NOTE — ED ADULT NURSE NOTE - SUICIDE SCREENING QUESTION 1
37 Y/O  male presents to ED for COVID swab c/o congestion. PT went to social event a few days ago and is now feeling congested. PT thinks congestion may be due to allergies but requests swab to double check his COVID status. PT is fully COVID vaccinated since February 2021. Patient refused

## 2024-01-13 NOTE — ED PROVIDER NOTE - CLINICAL SUMMARY MEDICAL DECISION MAKING FREE TEXT BOX
48-year-old female history of prior provoked PE, no longer on anticoagulation, ?asthma, presenting for evaluation of chest pain with associated shortness of breath, dyspnea on exertion for the past week getting progressively worse.  No recent travel, trauma.  No other acute complaints.  Well appearing, NAD, non toxic. NCAT PERRLA EOMI neck supple non tender normal wob cta bl rrr abdomen s nt nd no rebound no guarding WWPx4 neuro non focal. EKG imaging reviewed.  Patient states that she still feels short of breath.  Offered admission for further evaluation, patient declining, wants to follow-up outpatient with her cardiologist Dr. Liang.  Patient states that her shortness of breath also typically flares when air quality becomes poor.  Will give albuterol, steroids, reassess.

## 2024-01-13 NOTE — ED ADULT NURSE NOTE - NSFALLUNIVINTERV_ED_ALL_ED
Bed/Stretcher in lowest position, wheels locked, appropriate side rails in place/Call bell, personal items and telephone in reach/Instruct patient to call for assistance before getting out of bed/chair/stretcher/Non-slip footwear applied when patient is off stretcher/[place] to call system/Physically safe environment - no spills, clutter or unnecessary equipment/Purposeful proactive rounding/Room/bathroom lighting operational, light cord in reach Bed/Stretcher in lowest position, wheels locked, appropriate side rails in place/Call bell, personal items and telephone in reach/Instruct patient to call for assistance before getting out of bed/chair/stretcher/Non-slip footwear applied when patient is off stretcher/[place] to call system/Physically safe environment - no spills, clutter or unnecessary equipment/Purposeful proactive rounding/Room/bathroom lighting operational, light cord in reach

## 2024-01-25 ENCOUNTER — APPOINTMENT (OUTPATIENT)
Dept: PULMONOLOGY | Facility: CLINIC | Age: 49
End: 2024-01-25
Payer: COMMERCIAL

## 2024-01-25 VITALS
DIASTOLIC BLOOD PRESSURE: 77 MMHG | WEIGHT: 209 LBS | HEIGHT: 65 IN | HEART RATE: 67 BPM | SYSTOLIC BLOOD PRESSURE: 110 MMHG | BODY MASS INDEX: 34.82 KG/M2 | OXYGEN SATURATION: 94 %

## 2024-01-25 DIAGNOSIS — J45.909 UNSPECIFIED ASTHMA, UNCOMPLICATED: ICD-10-CM

## 2024-01-25 DIAGNOSIS — Z82.5 FAMILY HISTORY OF ASTHMA AND OTHER CHRONIC LOWER RESPIRATORY DISEASES: ICD-10-CM

## 2024-01-25 DIAGNOSIS — Z87.891 PERSONAL HISTORY OF NICOTINE DEPENDENCE: ICD-10-CM

## 2024-01-25 DIAGNOSIS — Z82.49 FAMILY HISTORY OF ISCHEMIC HEART DISEASE AND OTHER DISEASES OF THE CIRCULATORY SYSTEM: ICD-10-CM

## 2024-01-25 DIAGNOSIS — I26.99 OTHER PULMONARY EMBOLISM W/OUT ACUTE COR PULMONALE: ICD-10-CM

## 2024-01-25 PROCEDURE — 99204 OFFICE O/P NEW MOD 45 MIN: CPT

## 2024-01-25 PROCEDURE — 94010 BREATHING CAPACITY TEST: CPT

## 2024-01-25 PROCEDURE — G2211 COMPLEX E/M VISIT ADD ON: CPT | Mod: NC,1L

## 2024-01-25 RX ORDER — PREDNISONE 50 MG/1
50 TABLET ORAL
Qty: 5 | Refills: 0 | Status: ACTIVE | COMMUNITY
Start: 2024-01-25 | End: 1900-01-01

## 2024-01-25 RX ORDER — FAMOTIDINE 40 MG/1
40 TABLET, FILM COATED ORAL
Refills: 0 | Status: ACTIVE | COMMUNITY

## 2024-01-25 RX ORDER — ATORVASTATIN CALCIUM 80 MG/1
TABLET, FILM COATED ORAL
Refills: 0 | Status: ACTIVE | COMMUNITY

## 2024-02-02 RX ORDER — FLUTICASONE FUROATE AND VILANTEROL TRIFENATATE 100; 25 UG/1; UG/1
100-25 POWDER RESPIRATORY (INHALATION)
Qty: 60 | Refills: 3 | Status: COMPLETED | COMMUNITY
Start: 2021-05-01 | End: 2024-02-02

## 2024-02-02 RX ORDER — BUDESONIDE AND FORMOTEROL FUMARATE DIHYDRATE 80; 4.5 UG/1; UG/1
80-4.5 AEROSOL RESPIRATORY (INHALATION) TWICE DAILY
Qty: 1 | Refills: 4 | Status: DISCONTINUED | COMMUNITY
Start: 2024-01-25 | End: 2024-02-02

## 2024-02-08 ENCOUNTER — APPOINTMENT (OUTPATIENT)
Dept: PULMONOLOGY | Facility: HOSPITAL | Age: 49
End: 2024-02-08
Payer: MEDICAID

## 2024-02-08 ENCOUNTER — OUTPATIENT (OUTPATIENT)
Dept: OUTPATIENT SERVICES | Facility: HOSPITAL | Age: 49
LOS: 1 days | End: 2024-02-08
Payer: MEDICAID

## 2024-02-08 DIAGNOSIS — R06.02 SHORTNESS OF BREATH: ICD-10-CM

## 2024-02-08 PROCEDURE — 94664 DEMO&/EVAL PT USE INHALER: CPT

## 2024-02-08 PROCEDURE — 94729 DIFFUSING CAPACITY: CPT | Mod: 26

## 2024-02-08 PROCEDURE — 94727 GAS DIL/WSHOT DETER LNG VOL: CPT

## 2024-02-08 PROCEDURE — 94060 EVALUATION OF WHEEZING: CPT | Mod: 26

## 2024-02-08 PROCEDURE — 94070 EVALUATION OF WHEEZING: CPT

## 2024-02-08 PROCEDURE — 94729 DIFFUSING CAPACITY: CPT

## 2024-02-08 PROCEDURE — 94727 GAS DIL/WSHOT DETER LNG VOL: CPT | Mod: 26

## 2024-02-09 DIAGNOSIS — R06.02 SHORTNESS OF BREATH: ICD-10-CM

## 2024-03-07 ENCOUNTER — NON-APPOINTMENT (OUTPATIENT)
Age: 49
End: 2024-03-07

## 2024-03-28 ENCOUNTER — OUTPATIENT (OUTPATIENT)
Dept: OUTPATIENT SERVICES | Facility: HOSPITAL | Age: 49
LOS: 1 days | End: 2024-03-28
Payer: COMMERCIAL

## 2024-03-28 DIAGNOSIS — Z98.890 OTHER SPECIFIED POSTPROCEDURAL STATES: Chronic | ICD-10-CM

## 2024-03-28 DIAGNOSIS — Z00.8 ENCOUNTER FOR OTHER GENERAL EXAMINATION: ICD-10-CM

## 2024-03-28 DIAGNOSIS — R07.9 CHEST PAIN, UNSPECIFIED: ICD-10-CM

## 2024-03-28 PROCEDURE — 75574 CT ANGIO HRT W/3D IMAGE: CPT

## 2024-03-28 PROCEDURE — 75574 CT ANGIO HRT W/3D IMAGE: CPT | Mod: 26

## 2024-03-29 DIAGNOSIS — R07.9 CHEST PAIN, UNSPECIFIED: ICD-10-CM

## 2024-04-02 ENCOUNTER — OUTPATIENT (OUTPATIENT)
Dept: OUTPATIENT SERVICES | Facility: HOSPITAL | Age: 49
LOS: 1 days | End: 2024-04-02
Payer: COMMERCIAL

## 2024-04-02 DIAGNOSIS — Z98.890 OTHER SPECIFIED POSTPROCEDURAL STATES: Chronic | ICD-10-CM

## 2024-04-02 PROCEDURE — 75580 N-INVAS EST C FFR SW ALY CTA: CPT | Mod: 26

## 2024-04-02 PROCEDURE — 75580 N-INVAS EST C FFR SW ALY CTA: CPT

## 2024-04-03 DIAGNOSIS — R07.9 CHEST PAIN, UNSPECIFIED: ICD-10-CM

## 2024-04-04 DIAGNOSIS — R07.9 CHEST PAIN, UNSPECIFIED: ICD-10-CM

## 2024-05-16 NOTE — ED ADULT NURSE NOTE - NSICDXFAMILYHX_GEN_ALL_CORE_FT
During OV yesterday patient noted that he did sign KAILEY for his chiropractor. I do not see that we have received the xrays from his chiropractors office. Please contact them to request. Chiropractors information is in telephone encounter from  4/18/24   FAMILY HISTORY:  Family history of DVT

## 2024-05-31 ENCOUNTER — APPOINTMENT (OUTPATIENT)
Dept: PULMONOLOGY | Facility: CLINIC | Age: 49
End: 2024-05-31

## 2024-08-29 NOTE — ED ADULT NURSE NOTE - NS ED PATIENT SAFETY CONCERN
History of Present Illness     Patient Identification  Ramon Jacob is a 51 y.o. female.    Patient information was obtained from patient.      Chief Complaint   Chief Complaint   Patient presents with    Back Pain     Right lumbar 4,5,S 1       Patient presents For Right sided Facet blocks for pain in her Low back. This is a result of twisting her back. Onset of pain was 2 years ago and has been unchanged since. The pain is located in right lower back, described as stabbing and rated as moderate and 5 / 10, with radiation Rt groin abd her Calf. Symptoms include no other symptoms. The patient also complains of fever, dysuria, weight loss, history of cancer, history of osteoporosis, history of steroid use, obesity. The patient denies weakness, numbness, incontinence. The patient denies other injuries. Care prior to arrival consisted of Facet blocks with no relief.  MRI: 2/1/24: MRI lumbar spine sacralization L5 L4-5 circumferential disc bulge mild left lateral recess stenosis moderate bilateral foraminal stenosis right greater than left L3-4 mild left foraminal disc bulge with annular fissure. Mild left foraminal stenosis     History reviewed. No pertinent past medical history.  History reviewed. No pertinent family history.  Current Outpatient Medications   Medication Sig Dispense Refill    ibuprofen (ADVIL;MOTRIN) 600 MG tablet Take 1 tablet by mouth every 6 hours as needed for Pain      buPROPion (WELLBUTRIN XL) 150 MG extended release tablet Take 1 tablet by mouth daily       No current facility-administered medications for this visit.     No Known Allergies    Review of Systems  Review of Systems   Constitutional: Negative.    HENT: Negative.     Eyes: Negative.    Respiratory: Negative.     Cardiovascular: Negative.    Gastrointestinal: Negative.    Endocrine: Negative.    Genitourinary: Negative.    Musculoskeletal: Negative.    Skin: Negative.    Allergic/Immunologic: Negative.    Neurological:  Facet  blocks under fluroscopy.   Discussed procedure, risks and conplications. Concerns addressed, Informed consent obtained. Prone on Abigail table, Rt oblique view used to uagusto skin for needle placement, skin scrubbed with Betadine, anesthetized with 2cc of 2% Lidocaine. 23g 5inch needle advanced towards junctions of superior articulating processes and transverse processes of L4 and L5 vertebrae Right side, and junction of Superior articulating processes and ala of sacrum on Right  side, injected 0.3cc of 0.5% Bupivacaine with 100% relief, pt washed off the Betadine, applied bandages and discharged home in stable condition.     No

## 2024-09-10 NOTE — ED ADULT NURSE NOTE - SUICIDE SCREENING QUESTION 3
[FreeTextEntry1] : 60y/o male here today for urinary stones, seen on renal US. The patient states that she feels pain on the left flank MELVI. Nocturia X4/5, hematuria, dysuria, flank pain, or any other urinary issues MELVI Occasionally eating spicy, citrus, chocolate. Adequately hydrating   Tobacco use: negative Last creatinine: N/A Last UCx: N/A Uro meds: ABX completed 7 days ago  I explained to the patient that she will need to refer to ER in case of concomitant pain and chills/fever  Ordering CT scan to further characterize dimension and position of the stone. Blood draw for renal panel Collecting urine for UA and Culture  Will F/U in case of positive results 
No

## 2024-09-26 ENCOUNTER — APPOINTMENT (OUTPATIENT)
Dept: PULMONOLOGY | Facility: CLINIC | Age: 49
End: 2024-09-26
Payer: COMMERCIAL

## 2024-09-26 VITALS
HEIGHT: 65 IN | DIASTOLIC BLOOD PRESSURE: 82 MMHG | WEIGHT: 194 LBS | BODY MASS INDEX: 32.32 KG/M2 | OXYGEN SATURATION: 99 % | SYSTOLIC BLOOD PRESSURE: 120 MMHG | HEART RATE: 60 BPM

## 2024-09-26 DIAGNOSIS — I26.99 OTHER PULMONARY EMBOLISM W/OUT ACUTE COR PULMONALE: ICD-10-CM

## 2024-09-26 DIAGNOSIS — J45.909 UNSPECIFIED ASTHMA, UNCOMPLICATED: ICD-10-CM

## 2024-09-26 PROCEDURE — 99214 OFFICE O/P EST MOD 30 MIN: CPT

## 2024-09-26 PROCEDURE — G2211 COMPLEX E/M VISIT ADD ON: CPT | Mod: NC

## 2024-09-26 RX ORDER — FLUTICASONE PROPIONATE AND SALMETEROL 500; 50 UG/1; UG/1
500-50 POWDER RESPIRATORY (INHALATION)
Qty: 2 | Refills: 2 | Status: ACTIVE | COMMUNITY
Start: 2024-09-26 | End: 1900-01-01

## 2024-09-26 RX ORDER — ATORVASTATIN CALCIUM 20 MG/1
20 TABLET, FILM COATED ORAL
Refills: 0 | Status: ACTIVE | COMMUNITY

## 2024-09-26 RX ORDER — SEMAGLUTIDE 1.7 MG/.75ML
INJECTION, SOLUTION SUBCUTANEOUS
Refills: 0 | Status: ACTIVE | COMMUNITY

## 2024-09-26 NOTE — RESULTS/DATA
[TextEntry] : Data including documentation and labs from ED visit in January 2024 reviewed.  CT scan of the chest performed 1/13 images and radiologist read reviewed, by my read demonstrating mild bilateral mosaicism, potentially exacerbated by the presence of IV contrast.  No pulmonary embolism identified.  There are no pleural effusions, significant areas of atelectasis, nor lobar consolidations.

## 2024-10-21 ENCOUNTER — EMERGENCY (EMERGENCY)
Facility: HOSPITAL | Age: 49
LOS: 0 days | Discharge: HOME | End: 2024-10-22
Attending: STUDENT IN AN ORGANIZED HEALTH CARE EDUCATION/TRAINING PROGRAM
Payer: COMMERCIAL

## 2024-10-21 VITALS
OXYGEN SATURATION: 98 % | HEART RATE: 73 BPM | TEMPERATURE: 98 F | WEIGHT: 192.02 LBS | RESPIRATION RATE: 18 BRPM | DIASTOLIC BLOOD PRESSURE: 75 MMHG | HEIGHT: 65 IN | SYSTOLIC BLOOD PRESSURE: 110 MMHG

## 2024-10-21 DIAGNOSIS — I25.10 ATHEROSCLEROTIC HEART DISEASE OF NATIVE CORONARY ARTERY WITHOUT ANGINA PECTORIS: ICD-10-CM

## 2024-10-21 DIAGNOSIS — R07.89 OTHER CHEST PAIN: ICD-10-CM

## 2024-10-21 DIAGNOSIS — K21.9 GASTRO-ESOPHAGEAL REFLUX DISEASE WITHOUT ESOPHAGITIS: ICD-10-CM

## 2024-10-21 DIAGNOSIS — Z98.890 OTHER SPECIFIED POSTPROCEDURAL STATES: Chronic | ICD-10-CM

## 2024-10-21 DIAGNOSIS — Z91.018 ALLERGY TO OTHER FOODS: ICD-10-CM

## 2024-10-21 DIAGNOSIS — E78.5 HYPERLIPIDEMIA, UNSPECIFIED: ICD-10-CM

## 2024-10-21 DIAGNOSIS — J45.909 UNSPECIFIED ASTHMA, UNCOMPLICATED: ICD-10-CM

## 2024-10-21 DIAGNOSIS — Z86.711 PERSONAL HISTORY OF PULMONARY EMBOLISM: ICD-10-CM

## 2024-10-21 DIAGNOSIS — Z86.16 PERSONAL HISTORY OF COVID-19: ICD-10-CM

## 2024-10-21 LAB
ALBUMIN SERPL ELPH-MCNC: 4.4 G/DL — SIGNIFICANT CHANGE UP (ref 3.5–5.2)
ALP SERPL-CCNC: 92 U/L — SIGNIFICANT CHANGE UP (ref 30–115)
ALT FLD-CCNC: 24 U/L — SIGNIFICANT CHANGE UP (ref 0–41)
ANION GAP SERPL CALC-SCNC: 11 MMOL/L — SIGNIFICANT CHANGE UP (ref 7–14)
AST SERPL-CCNC: 23 U/L — SIGNIFICANT CHANGE UP (ref 0–41)
BASOPHILS # BLD AUTO: 0.05 K/UL — SIGNIFICANT CHANGE UP (ref 0–0.2)
BASOPHILS NFR BLD AUTO: 0.7 % — SIGNIFICANT CHANGE UP (ref 0–1)
BILIRUB SERPL-MCNC: 0.4 MG/DL — SIGNIFICANT CHANGE UP (ref 0.2–1.2)
BUN SERPL-MCNC: 12 MG/DL — SIGNIFICANT CHANGE UP (ref 10–20)
CALCIUM SERPL-MCNC: 9.9 MG/DL — SIGNIFICANT CHANGE UP (ref 8.4–10.4)
CHLORIDE SERPL-SCNC: 104 MMOL/L — SIGNIFICANT CHANGE UP (ref 98–110)
CO2 SERPL-SCNC: 24 MMOL/L — SIGNIFICANT CHANGE UP (ref 17–32)
CREAT SERPL-MCNC: 0.8 MG/DL — SIGNIFICANT CHANGE UP (ref 0.7–1.5)
EGFR: 90 ML/MIN/1.73M2 — SIGNIFICANT CHANGE UP
EOSINOPHIL # BLD AUTO: 0.08 K/UL — SIGNIFICANT CHANGE UP (ref 0–0.7)
EOSINOPHIL NFR BLD AUTO: 1.1 % — SIGNIFICANT CHANGE UP (ref 0–8)
GLUCOSE SERPL-MCNC: 90 MG/DL — SIGNIFICANT CHANGE UP (ref 70–99)
HCT VFR BLD CALC: 41.9 % — SIGNIFICANT CHANGE UP (ref 37–47)
HGB BLD-MCNC: 14.2 G/DL — SIGNIFICANT CHANGE UP (ref 12–16)
IMM GRANULOCYTES NFR BLD AUTO: 0.4 % — HIGH (ref 0.1–0.3)
LYMPHOCYTES # BLD AUTO: 1.95 K/UL — SIGNIFICANT CHANGE UP (ref 1.2–3.4)
LYMPHOCYTES # BLD AUTO: 27.3 % — SIGNIFICANT CHANGE UP (ref 20.5–51.1)
MCHC RBC-ENTMCNC: 31.4 PG — HIGH (ref 27–31)
MCHC RBC-ENTMCNC: 33.9 G/DL — SIGNIFICANT CHANGE UP (ref 32–37)
MCV RBC AUTO: 92.7 FL — SIGNIFICANT CHANGE UP (ref 81–99)
MONOCYTES # BLD AUTO: 0.68 K/UL — HIGH (ref 0.1–0.6)
MONOCYTES NFR BLD AUTO: 9.5 % — HIGH (ref 1.7–9.3)
NEUTROPHILS # BLD AUTO: 4.35 K/UL — SIGNIFICANT CHANGE UP (ref 1.4–6.5)
NEUTROPHILS NFR BLD AUTO: 61 % — SIGNIFICANT CHANGE UP (ref 42.2–75.2)
NRBC # BLD: 0 /100 WBCS — SIGNIFICANT CHANGE UP (ref 0–0)
PLATELET # BLD AUTO: 248 K/UL — SIGNIFICANT CHANGE UP (ref 130–400)
PMV BLD: 10.7 FL — HIGH (ref 7.4–10.4)
POTASSIUM SERPL-MCNC: 4.3 MMOL/L — SIGNIFICANT CHANGE UP (ref 3.5–5)
POTASSIUM SERPL-SCNC: 4.3 MMOL/L — SIGNIFICANT CHANGE UP (ref 3.5–5)
PROT SERPL-MCNC: 7.3 G/DL — SIGNIFICANT CHANGE UP (ref 6–8)
RBC # BLD: 4.52 M/UL — SIGNIFICANT CHANGE UP (ref 4.2–5.4)
RBC # FLD: 12 % — SIGNIFICANT CHANGE UP (ref 11.5–14.5)
SODIUM SERPL-SCNC: 139 MMOL/L — SIGNIFICANT CHANGE UP (ref 135–146)
TROPONIN T, HIGH SENSITIVITY RESULT: 6 NG/L — SIGNIFICANT CHANGE UP (ref 6–13)
TROPONIN T, HIGH SENSITIVITY RESULT: 7 NG/L — SIGNIFICANT CHANGE UP (ref 6–13)
WBC # BLD: 7.14 K/UL — SIGNIFICANT CHANGE UP (ref 4.8–10.8)
WBC # FLD AUTO: 7.14 K/UL — SIGNIFICANT CHANGE UP (ref 4.8–10.8)

## 2024-10-21 PROCEDURE — 85025 COMPLETE CBC W/AUTO DIFF WBC: CPT

## 2024-10-21 PROCEDURE — 93005 ELECTROCARDIOGRAM TRACING: CPT

## 2024-10-21 PROCEDURE — 93017 CV STRESS TEST TRACING ONLY: CPT

## 2024-10-21 PROCEDURE — 80053 COMPREHEN METABOLIC PANEL: CPT

## 2024-10-21 PROCEDURE — A9500: CPT

## 2024-10-21 PROCEDURE — 84484 ASSAY OF TROPONIN QUANT: CPT

## 2024-10-21 PROCEDURE — 78452 HT MUSCLE IMAGE SPECT MULT: CPT | Mod: MC

## 2024-10-21 PROCEDURE — 99223 1ST HOSP IP/OBS HIGH 75: CPT

## 2024-10-21 PROCEDURE — 36415 COLL VENOUS BLD VENIPUNCTURE: CPT

## 2024-10-21 PROCEDURE — G0378: CPT

## 2024-10-21 PROCEDURE — 99285 EMERGENCY DEPT VISIT HI MDM: CPT | Mod: 25

## 2024-10-21 PROCEDURE — 71045 X-RAY EXAM CHEST 1 VIEW: CPT

## 2024-10-21 PROCEDURE — 36000 PLACE NEEDLE IN VEIN: CPT

## 2024-10-21 PROCEDURE — 71045 X-RAY EXAM CHEST 1 VIEW: CPT | Mod: 26

## 2024-10-21 PROCEDURE — 93010 ELECTROCARDIOGRAM REPORT: CPT

## 2024-10-21 RX ORDER — CETIRIZINE HYDROCHLORIDE 10 MG/1
10 TABLET ORAL ONCE
Refills: 0 | Status: COMPLETED | OUTPATIENT
Start: 2024-10-21 | End: 2024-10-21

## 2024-10-21 RX ORDER — REGADENOSON 0.08 MG/ML
0.4 INJECTION, SOLUTION INTRAVENOUS ONCE
Refills: 0 | Status: DISCONTINUED | OUTPATIENT
Start: 2024-10-21 | End: 2024-10-22

## 2024-10-21 RX ORDER — ATORVASTATIN CALCIUM 10 MG/1
80 TABLET, FILM COATED ORAL AT BEDTIME
Refills: 0 | Status: DISCONTINUED | OUTPATIENT
Start: 2024-10-21 | End: 2024-10-22

## 2024-10-21 RX ADMIN — ATORVASTATIN CALCIUM 80 MILLIGRAM(S): 10 TABLET, FILM COATED ORAL at 23:37

## 2024-10-21 RX ADMIN — CETIRIZINE HYDROCHLORIDE 10 MILLIGRAM(S): 10 TABLET ORAL at 23:36

## 2024-10-21 NOTE — ED CDU PROVIDER INITIAL DAY NOTE - OBJECTIVE STATEMENT
50 y/o female w/ PMH of asthma and CAD presents to the ED c/o CP since last night. Pt states that the pain is exacerbated by cough and movement. Not improved with rest or exacerbated by exertion. Denies SOB/fever/Weakness/abd pain/N/V/D. Had recent cardiac imaging that showed CAD-Rad 3/4.

## 2024-10-21 NOTE — ED PROVIDER NOTE - PROGRESS NOTE DETAILS
Authored by Dr. Youngblood: Discussed case with Dr. Huang.  Will place in observation for stress test

## 2024-10-21 NOTE — ED ADULT TRIAGE NOTE - CHIEF COMPLAINT QUOTE
Pt. c/o chest pain x 1 month, worsening since last night. Pt. states she feels like she was punched in the chest. Pt. states she has a 50% blockage in the arteries; dx in May. Pt. has hx of asthma.

## 2024-10-21 NOTE — ED PROVIDER NOTE - CLINICAL SUMMARY MEDICAL DECISION MAKING FREE TEXT BOX
49-year-old female past medical history of asthma GERD hyperlipidemia nonobstructive CAD had CCTA done in March which showed RCA stenosis, CAD RADS 3/4, follows with Dr. Liang, presents with left-sided chest pain since yesterday.  Complains of sharp soreness worse with movement nonradiating.  No fever cough.  No leg pain swelling immobilization hormones hemoptysis.  No tearing back pain.  No trauma.  Non-smoker.  Positive family history of CAD.  Present at rest.    On exam, AFVSS, Well appearing, No acute distress, NCAT, EOMI, PERRLA, MMM, Neck supple, LCTAB, RRR nl s1s2 No mrg, Abdomen Soft NTND, AAOx3, No Focal Deficits, No LE edema or calf TTP,    A/P; chest pain, recent abnormal CCTA, labs EKG chest x-ray performed, troponin negative, EKG normal sinus, chest x-ray clear, case discussed with patient's cardiologist who recommends observation unit for stress testing, patient agreeable    Labs and EKG were ordered and reviewed.  Imaging was ordered and reviewed by me.  Appropriate medications for patient's presenting complaints were ordered and effects were reassessed.  Patient's records (prior hospital, ED visit, and/or nursing home notes if available) were reviewed.  Additional history was obtained from EMS, family, and/or PCP (where available).  Escalation to admission/observation was considered.  Patient requires inpatient hospitalization - monitored setting.

## 2024-10-21 NOTE — ED PROVIDER NOTE - PHYSICAL EXAMINATION
VITAL SIGNS: I have reviewed nursing notes and confirm.  CONSTITUTIONAL: Well-developed; well-nourished; in no acute distress.  SKIN: Skin exam is warm and dry, no acute rash.  HEAD: Normocephalic; atraumatic.  CARD: S1, S2 normal; no murmurs, gallops, or rubs. Regular rate and rhythm.  RESP: Normal respiratory effort, no tachypnea or distress. Lungs CTAB, no wheezes, rales or rhonchi.  ABD: soft, NT/ND.  NEURO: Alert, oriented. Grossly unremarkable. No focal deficits.  PSYCH: Cooperative, appropriate.

## 2024-10-21 NOTE — ED PROVIDER NOTE - PROVIDER WHO IDEPENDENTLY INTERPRETED
METOPROLOL SUCC ER 50 MG TAB  Hypertension Medications Protocol Failed  The pt last CMP was done on 08/17/17. ESCITALOPRAM 10 MG TABLET  Non protocol medication. She does have an upcoming appt with you on 10/04/18. Please see pended medications. Please sign if appropriate.       Thank you
intact
oli

## 2024-10-21 NOTE — ED PROVIDER NOTE - ATTENDING CONTRIBUTION TO CARE
49-year-old female past medical history of asthma GERD hyperlipidemia nonobstructive CAD had CCTA done in March which showed RCA stenosis, CAD RADS 3/4, follows with Dr. Liang, presents with left-sided chest pain since yesterday.  Complains of sharp soreness worse with movement nonradiating.  No fever cough.  No leg pain swelling immobilization hormones hemoptysis.  No tearing back pain.  No trauma.  Non-smoker.  Positive family history of CAD.  Present at rest.    On exam, AFVSS, Well appearing, No acute distress, NCAT, EOMI, PERRLA, MMM, Neck supple, LCTAB, RRR nl s1s2 No mrg, Abdomen Soft NTND, AAOx3, No Focal Deficits, No LE edema or calf TTP,    A/P; chest pain, recent abnormal CCTA, labs EKG chest x-ray performed, troponin negative, EKG normal sinus, chest x-ray clear, case discussed with patient's cardiologist who recommends observation unit for stress testing, patient agreeable

## 2024-10-21 NOTE — ED PROVIDER NOTE - EKG/XRAY ADDITIONAL INFORMATION
ED EKG: my independent interpretation - Dr. Patricia Kelly : [78 NSR, no STEMI]   ED CXR prelim, my independent interpretation - Dr. Patricia Kelly: [No PTX, No infiltrates, No Free air]

## 2024-10-22 VITALS
OXYGEN SATURATION: 97 % | SYSTOLIC BLOOD PRESSURE: 103 MMHG | HEART RATE: 54 BPM | RESPIRATION RATE: 18 BRPM | TEMPERATURE: 98 F | DIASTOLIC BLOOD PRESSURE: 73 MMHG

## 2024-10-22 PROCEDURE — 93010 ELECTROCARDIOGRAM REPORT: CPT

## 2024-10-22 PROCEDURE — 93016 CV STRESS TEST SUPVJ ONLY: CPT

## 2024-10-22 PROCEDURE — 78452 HT MUSCLE IMAGE SPECT MULT: CPT | Mod: 26,MC

## 2024-10-22 PROCEDURE — 93018 CV STRESS TEST I&R ONLY: CPT

## 2024-10-22 PROCEDURE — 99239 HOSP IP/OBS DSCHRG MGMT >30: CPT

## 2024-10-22 NOTE — ED CDU PROVIDER SUBSEQUENT DAY NOTE - CLINICAL SUMMARY MEDICAL DECISION MAKING FREE TEXT BOX
Received pt in obs. Here with cp since the day prior which has been on and off for awhile. Had CCTA On 3/28/24 showing CAD RADS 3/4. Underwent cardiac cath at [facility] in 5/2024 showing 50% lesion but no stents placed. Had her statin increased at that time. Saw her pulmonologist who increased her asthma meds. Recently saw cardiologist Dr. Liang last wk who increased her statin again as her cholesterol was high. Pt had PE in 2021 after covid but most recent CTA chest on 1/13/24 was negative for PE. Currently feeling well. Dr. Liang contact yesterday who recommended stress test. Pt pending nuclear stress test. Received pt in obs. Here with cp since the day prior which has been on and off for awhile. Had CCTA On 3/28/24 showing CAD RADS 3/4. Underwent cardiac cath at [facility] in 5/2024 showing 50% lesion but no stents placed. Had her statin increased at that time. Saw her pulmonologist who increased her asthma meds. Recently saw cardiologist Dr. Liang last wk who increased her statin again as her cholesterol was high. Pt had PE in 2021 after covid but most recent CTA chest on 1/13/24 was negative for PE. Currently feeling well. Trop 7>6. Ekg with NSR, no ischemic changes. Dr. Liang contact yesterday who recommended stress test. Pt pending nuclear stress test.

## 2024-10-22 NOTE — ED CDU PROVIDER DISPOSITION NOTE - ATTENDING CONTRIBUTION TO CARE
I have personally seen and examined this patient. I have fully participated in the care of this patient. I have made amendments to the documentation where appropriate and otherwise agree with the history, physical exam, and plan as documented by the KEITH.    Pt here with cp since the day prior which has been on and off for awhile. Had CCTA On 3/28/24 showing CAD RADS 3/4. Underwent cardiac cath at [facility] in 5/2024 showing 50% lesion but no stents placed. Had her statin increased at that time. Saw her pulmonologist who increased her asthma meds. Recently saw cardiologist Dr. Liang last wk who increased her statin again as her cholesterol was high. Pt had PE in 2021 after covid but most recent CTA chest on 1/13/24 was negative for PE. Currently feeling well. Trop 7>6. Ekg with NSR, no ischemic changes. Dr. Liang contacted yesterday who recommended stress test. Nuclear stress test with no e/o ischemia; has EG 61%. Pt will f/u with Dr. Liang. Strict ED return precautions given. Pt verbalized understanding and was agreeable with plan.

## 2024-10-22 NOTE — ED CDU PROVIDER DISPOSITION NOTE - NSFOLLOWUPINSTRUCTIONS_ED_ALL_ED_FT

## 2024-10-22 NOTE — ED CDU PROVIDER DISPOSITION NOTE - CARE PROVIDER_API CALL
Nicole Liang  Interventional Cardiology  [address] [place] [address]  Phone: [phone]  Fax: [phone]  Follow Up Time: Routine    Franklin Seals  Internal Medicine  [address] [place] [address]  Phone: [phone]  Fax: [phone]  Follow Up Time: 1-3 Days

## 2024-10-22 NOTE — ED CDU PROVIDER DISPOSITION NOTE - PROVIDER TOKENS
PROVIDER:[TOKEN:[[id number]],FOLLOWUP:[Routine]],PROVIDER:[TOKEN:[[id number]],FOLLOWUP:[1-3 Days]]

## 2024-10-22 NOTE — ED ADULT NURSE REASSESSMENT NOTE - NS ED NURSE REASSESS COMMENT FT1
Ptreassessed A/O [id number] felling better did eat 75% of breakfast ,stress test order is done completed ,pt is seen evaluate ed attending clear to go home NAD noted . Initial (On Arrival)

## 2024-10-22 NOTE — ED CDU PROVIDER DISPOSITION NOTE - CLINICAL COURSE
Pt here with cp since the day prior which has been on and off for awhile. Had CCTA On 3/28/24 showing CAD RADS 3/4. Underwent cardiac cath at [facility] in 5/2024 showing 50% lesion but no stents placed. Had her statin increased at that time. Saw her pulmonologist who increased her asthma meds. Recently saw cardiologist Dr. Liang last wk who increased her statin again as her cholesterol was high. Pt had PE in 2021 after covid but most recent CTA chest on 1/13/24 was negative for PE. Currently feeling well. Trop 7>6. Ekg with NSR, no ischemic changes. Dr. Liang contacted yesterday who recommended stress test. Nuclear stress test with no e/o ischemia; has EG 61%. Pt will f/u with Dr. Liang. Strict ED return precautions given. Pt verbalized understanding and was agreeable with plan.

## 2024-10-22 NOTE — ED CDU PROVIDER DISPOSITION NOTE - PATIENT PORTAL LINK FT
You can access the FollowMyHealth Patient Portal offered by [facility] by registering at the following website: [email or web address]. By joining [employer]’s FollowMyHealth portal, you will also be able to view your health information using other applications (apps) compatible with our system.

## 2024-10-22 NOTE — ED CDU PROVIDER DISPOSITION NOTE - CARE PROVIDERS DIRECT ADDRESSES
[email or web address]

## 2024-11-21 ENCOUNTER — APPOINTMENT (OUTPATIENT)
Dept: PULMONOLOGY | Facility: CLINIC | Age: 49
End: 2024-11-21